# Patient Record
Sex: MALE | Race: WHITE | NOT HISPANIC OR LATINO | Employment: FULL TIME | URBAN - METROPOLITAN AREA
[De-identification: names, ages, dates, MRNs, and addresses within clinical notes are randomized per-mention and may not be internally consistent; named-entity substitution may affect disease eponyms.]

---

## 2017-09-18 RX ORDER — BUTALBITAL, ACETAMINOPHEN AND CAFFEINE 50; 325; 40 MG/1; MG/1; MG/1
TABLET ORAL
Qty: 30 TABLET | Refills: 3 | Status: SHIPPED | OUTPATIENT
Start: 2017-09-18 | End: 2018-01-18 | Stop reason: SDUPTHER

## 2017-10-26 ENCOUNTER — TELEPHONE (OUTPATIENT)
Dept: PRIMARY CARE CLINIC | Facility: CLINIC | Age: 37
End: 2017-10-26

## 2017-10-26 NOTE — TELEPHONE ENCOUNTER
----- Message from Sasha Dowd sent at 10/26/2017  2:52 PM CDT -----  Contact: Patient  Patient, Hadley Fulton,[phone] is calling regarding a same day appointment  (10/26) or appointment for tomorrow (10/27).  Patient has a rash on his upper leg on both sides, rash has really flared up.  Dr. Delgado has been treating patient for this, patient feels it needs attention asap.  1st available appoint 11/10/2017.   Please advise.  Thanks!

## 2017-10-26 NOTE — TELEPHONE ENCOUNTER
Pt denied appointment offered for tomorrow (10/27/17) says he is going somewhere else because the call room told him he couldn't be seen for 2 weeks. I told pt Dr Delgado's scheudle was booked so that's why they told him that BUT Dr Delgado would see him anyway. Pt denied

## 2018-01-18 RX ORDER — BUTALBITAL, ACETAMINOPHEN AND CAFFEINE 50; 325; 40 MG/1; MG/1; MG/1
TABLET ORAL
Qty: 30 TABLET | Refills: 0 | Status: SHIPPED | OUTPATIENT
Start: 2018-01-18 | End: 2018-03-14

## 2018-03-14 ENCOUNTER — OFFICE VISIT (OUTPATIENT)
Dept: PRIMARY CARE CLINIC | Facility: CLINIC | Age: 38
End: 2018-03-14
Payer: COMMERCIAL

## 2018-03-14 VITALS
DIASTOLIC BLOOD PRESSURE: 86 MMHG | OXYGEN SATURATION: 97 % | BODY MASS INDEX: 32.72 KG/M2 | HEART RATE: 69 BPM | SYSTOLIC BLOOD PRESSURE: 135 MMHG | RESPIRATION RATE: 18 BRPM | TEMPERATURE: 99 F | WEIGHT: 246.88 LBS | HEIGHT: 73 IN

## 2018-03-14 DIAGNOSIS — J01.90 ACUTE NON-RECURRENT SINUSITIS, UNSPECIFIED LOCATION: Primary | ICD-10-CM

## 2018-03-14 PROCEDURE — 99999 PR PBB SHADOW E&M-EST. PATIENT-LVL III: CPT | Mod: PBBFAC,,, | Performed by: INTERNAL MEDICINE

## 2018-03-14 PROCEDURE — 96372 THER/PROPH/DIAG INJ SC/IM: CPT | Mod: S$GLB,,, | Performed by: INTERNAL MEDICINE

## 2018-03-14 PROCEDURE — 99213 OFFICE O/P EST LOW 20 MIN: CPT | Mod: 25,S$GLB,, | Performed by: INTERNAL MEDICINE

## 2018-03-14 RX ORDER — BUPRENORPHINE AND NALOXONE 8; 2 MG/1; MG/1
FILM, SOLUBLE BUCCAL; SUBLINGUAL
COMMUNITY
End: 2023-06-26

## 2018-03-14 RX ORDER — ZOLPIDEM TARTRATE 10 MG/1
5 TABLET ORAL NIGHTLY PRN
COMMUNITY
End: 2023-06-26

## 2018-03-14 RX ORDER — CODEINE PHOSPHATE AND GUAIFENESIN 10; 100 MG/5ML; MG/5ML
5 SOLUTION ORAL 3 TIMES DAILY PRN
Qty: 150 ML | Refills: 0 | Status: SHIPPED | OUTPATIENT
Start: 2018-03-14 | End: 2018-03-24

## 2018-03-14 RX ORDER — AMOXICILLIN AND CLAVULANATE POTASSIUM 875; 125 MG/1; MG/1
1 TABLET, FILM COATED ORAL EVERY 12 HOURS
Qty: 20 TABLET | Refills: 0 | Status: SHIPPED | OUTPATIENT
Start: 2018-03-14 | End: 2018-03-24

## 2018-03-14 RX ORDER — BETAMETHASONE SODIUM PHOSPHATE AND BETAMETHASONE ACETATE 3; 3 MG/ML; MG/ML
12 INJECTION, SUSPENSION INTRA-ARTICULAR; INTRALESIONAL; INTRAMUSCULAR; SOFT TISSUE
Status: COMPLETED | OUTPATIENT
Start: 2018-03-14 | End: 2018-03-14

## 2018-03-14 RX ADMIN — BETAMETHASONE SODIUM PHOSPHATE AND BETAMETHASONE ACETATE 12 MG: 3; 3 INJECTION, SUSPENSION INTRA-ARTICULAR; INTRALESIONAL; INTRAMUSCULAR; SOFT TISSUE at 12:03

## 2018-03-14 NOTE — PROGRESS NOTES
Patient ID by name and . NKDA. Celestone 12 mg IM injection given in left ventrogluteal using aseptic technique. Aspirated with no blood noted. Patient tolerated well. Given per physicians order. No adverse reaction noted.

## 2018-03-14 NOTE — PROGRESS NOTES
Subjective:       Patient ID: Hadley Fulton is a 37 y.o. male.    Chief Complaint: Sinusitis    HPI  Pt c/o sinuses congestion coughing  Cough more at night x 3-4 days not better no h/o allergy asthma no smoke  Review of Systems    Objective:      Physical Exam   Constitutional: He is oriented to person, place, and time. He appears well-developed and well-nourished. No distress.   HENT:   Head: Normocephalic and atraumatic.   Right Ear: External ear normal.   Left Ear: External ear normal.   Nose: Nose normal.   Mouth/Throat: Oropharynx is clear and moist. No oropharyngeal exudate.   Eyes: Conjunctivae and EOM are normal. Pupils are equal, round, and reactive to light. Right eye exhibits no discharge. Left eye exhibits no discharge.   Neck: Normal range of motion. Neck supple. No thyromegaly present.   Cardiovascular: Normal rate, regular rhythm, normal heart sounds and intact distal pulses.  Exam reveals no gallop and no friction rub.    No murmur heard.  Pulmonary/Chest: Effort normal and breath sounds normal. No respiratory distress. He has no wheezes. He has no rales. He exhibits no tenderness.   Abdominal: Soft. Bowel sounds are normal. He exhibits no distension. There is no tenderness. There is no rebound and no guarding.   Musculoskeletal: Normal range of motion. He exhibits no edema, tenderness or deformity.   Lymphadenopathy:     He has no cervical adenopathy.   Neurological: He is alert and oriented to person, place, and time.   Skin: Skin is warm and dry. Capillary refill takes less than 2 seconds. No rash noted. No erythema.   Psychiatric: He has a normal mood and affect. Judgment and thought content normal.   Nursing note and vitals reviewed.      Assessment:       No diagnosis found.    Plan:       There are no diagnoses linked to this encounter.

## 2018-04-08 ENCOUNTER — OFFICE VISIT (OUTPATIENT)
Dept: URGENT CARE | Facility: CLINIC | Age: 38
End: 2018-04-08
Payer: COMMERCIAL

## 2018-04-08 VITALS
WEIGHT: 235 LBS | HEIGHT: 73 IN | OXYGEN SATURATION: 97 % | DIASTOLIC BLOOD PRESSURE: 78 MMHG | HEART RATE: 80 BPM | SYSTOLIC BLOOD PRESSURE: 129 MMHG | BODY MASS INDEX: 31.14 KG/M2 | TEMPERATURE: 98 F

## 2018-04-08 DIAGNOSIS — R21 RASH: ICD-10-CM

## 2018-04-08 DIAGNOSIS — H70.91 MASTOIDITIS OF RIGHT SIDE: Primary | ICD-10-CM

## 2018-04-08 PROCEDURE — 99203 OFFICE O/P NEW LOW 30 MIN: CPT | Mod: S$GLB,,, | Performed by: FAMILY MEDICINE

## 2018-04-08 RX ORDER — VALACYCLOVIR HYDROCHLORIDE 1 G/1
1000 TABLET, FILM COATED ORAL 3 TIMES DAILY
Qty: 21 TABLET | Refills: 0 | Status: SHIPPED | OUTPATIENT
Start: 2018-04-08 | End: 2018-04-09 | Stop reason: SDUPTHER

## 2018-04-08 RX ORDER — CLINDAMYCIN HYDROCHLORIDE 300 MG/1
300 CAPSULE ORAL EVERY 8 HOURS
Qty: 30 CAPSULE | Refills: 0 | Status: SHIPPED | OUTPATIENT
Start: 2018-04-08 | End: 2018-04-18

## 2018-04-08 NOTE — PROGRESS NOTES
"Subjective:       Patient ID: Hadley Fulton is a 37 y.o. male.    Vitals:  height is 6' 1" (1.854 m) and weight is 106.6 kg (235 lb). His temperature is 97.8 °F (36.6 °C). His blood pressure is 129/78 and his pulse is 80. His oxygen saturation is 97%.     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    Per patient 4-5 day ago right ear pain started. Then 2-3 days ago painful rash started on his scalp and has spread upwards. He reports the rash is painful.  He has is always wrestling and denies any head trauma or injury recently. No sick contacts. He has never had anything like this before. He denies fevers, chills, fatigue, malaise or night sweats.         Rash   This is a new problem. The current episode started in the past 7 days. The affected locations include the scalp. The rash is characterized by pain, swelling and redness. Past treatments include antibiotics. The treatment provided mild relief.     Review of Systems   Constitution: Negative.   HENT: Positive for ear pain.    Eyes: Negative.    Cardiovascular: Negative.    Respiratory: Negative.    Endocrine: Negative.    Skin: Positive for rash and suspicious lesions.   Musculoskeletal: Positive for neck pain.   Gastrointestinal: Negative.    Genitourinary: Negative.        Objective:      Physical Exam   Constitutional: He is oriented to person, place, and time. He appears well-developed and well-nourished. No distress.   HENT:   Head: Normocephalic and atraumatic.   Right Ear: Hearing, tympanic membrane, external ear and ear canal normal.   Left Ear: Hearing, tympanic membrane, external ear and ear canal normal.   Nose: Mucosal edema present. Right sinus exhibits no maxillary sinus tenderness and no frontal sinus " tenderness. Left sinus exhibits no maxillary sinus tenderness and no frontal sinus tenderness.   Mouth/Throat: Uvula is midline and oropharynx is clear and moist. No oropharyngeal exudate, posterior oropharyngeal edema, posterior oropharyngeal erythema or tonsillar abscesses.   TTP right mastoid    Eyes: Conjunctivae and EOM are normal. Pupils are equal, round, and reactive to light.   Neck: Normal range of motion. Neck supple.   Cardiovascular: Normal rate, regular rhythm, normal heart sounds and intact distal pulses.  Exam reveals no gallop and no friction rub.    No murmur heard.  Pulmonary/Chest: Effort normal and breath sounds normal. No respiratory distress. He has no wheezes. He has no rales. He exhibits no tenderness.   Abdominal: Soft. Bowel sounds are normal. He exhibits no distension and no mass. There is no tenderness. There is no rebound and no guarding.   Musculoskeletal: Normal range of motion.        Cervical back: Normal.   Neurological: He is alert and oriented to person, place, and time. No cranial nerve deficit.   Skin: Skin is warm and dry. He is not diaphoretic.   Ascending erythematous papules scalp midline    Vitals reviewed.      Assessment:       1. Mastoiditis of right side    2. Rash        Plan:         Mastoiditis of right side  -discussed the need to close follow up and ER prompts reviewed, will schedule CT scan and ENT follow up with any worsening in the next 24 hours  -handout given about mastoiditis   -     CT Maxillofacial Without Contrast; Future; Expected date: 04/08/2018  -     Ambulatory consult to ENT    Rash  -concern for shingles, will start valtrex    Other orders  -     valACYclovir (VALTREX) 1000 MG tablet; Take 1 tablet (1,000 mg total) by mouth 3 (three) times daily.  Dispense: 21 tablet; Refill: 0  -     clindamycin (CLEOCIN) 300 MG capsule; Take 1 capsule (300 mg total) by mouth every 8 (eight) hours.  Dispense: 30 capsule; Refill: 0

## 2018-04-08 NOTE — PATIENT INSTRUCTIONS
When Your Child Has Mastoiditis     Infection of the mastoid can cause the skin above it to swell. This swelling can cause the ear to turn forward.   Your child has mastoiditis. This is an infection of the mastoid, the hard, bony area located right behind the ear. It's most often the result of an infection that started in the middle ear and spread to the bone.  What are the risk factors for mastoiditis?  Mastoiditis is more common in children than adults. Having any of the following may make getting it more likely:  · An ear infection  · Eustachian tube problems  · A problem with the immune system  What are the signs and symptoms of mastoiditis?  · Fever  · Ear pain  · Swelling over the mastoid bone causing the ear to turn forward  · Redness, tenderness, or swelling behind the ear  · Drainage from the ear canal or dizziness. This is uncommon.  · Weakened facial muscles. This is also uncommon.  How is mastoiditis diagnosed?  Your childs healthcare provider will ask about your childs medical history. He or she will also do a physical exam. This helps find the best treatment. An imaging test, such as CT scan, may be done to help the healthcare provider make a diagnosis and view the mastoid area.  How is mastoiditis treated?  If mastoiditis is suspected, your child may be admitted into the hospital for evaluation and treatment. The hospital stay can last for 5 to 7 days or more. In the hospital, your child will be given intravenous (IV) antibiotics for the infection. Your child will see an otolaryngologist. This is a doctor who specializes in treating problems of the ears, nose, and throat (ENT). The ENT doctor may need to make a tiny incision in the eardrum to allow trapped fluid to drain out. This is called a myringotomy. It relieves pressure and the fluid can be tested. The test results help the ENT doctor determine which antibiotic to give your child. If these treatments dont work, your child may need surgery  to remove parts of the infected mastoid. This is called a mastoidectomy.  Long-term concerns  Once treated, the mastoid often causes no long-term problems. But if left untreated, mastoiditis can lead to a serious infection in and around the brain. To protect your childs health, follow up with his or her regular healthcare provider.  Date Last Reviewed: 11/1/2016  © 5292-9971 [employer]. [address] [place] [address], [place] [address]. All rights reserved. This information is not intended as a substitute for professional medical care. Always follow your healthcare professional's instructions.

## 2018-04-09 ENCOUNTER — OFFICE VISIT (OUTPATIENT)
Dept: OTOLARYNGOLOGY | Facility: CLINIC | Age: 38
End: 2018-04-09
Payer: COMMERCIAL

## 2018-04-09 ENCOUNTER — TELEPHONE (OUTPATIENT)
Dept: INTERNAL MEDICINE | Facility: CLINIC | Age: 38
End: 2018-04-09

## 2018-04-09 VITALS
HEIGHT: 73 IN | SYSTOLIC BLOOD PRESSURE: 112 MMHG | DIASTOLIC BLOOD PRESSURE: 77 MMHG | TEMPERATURE: 99 F | HEART RATE: 77 BPM

## 2018-04-09 DIAGNOSIS — B02.29 HERPES ZOSTER INVOLVING CERVICAL DERMATOME: Primary | ICD-10-CM

## 2018-04-09 DIAGNOSIS — H70.91 MASTOIDITIS OF RIGHT SIDE: Primary | ICD-10-CM

## 2018-04-09 PROCEDURE — 99204 OFFICE O/P NEW MOD 45 MIN: CPT | Mod: S$GLB,,, | Performed by: OTOLARYNGOLOGY

## 2018-04-09 RX ORDER — VALACYCLOVIR HYDROCHLORIDE 1 G/1
1000 TABLET, FILM COATED ORAL 3 TIMES DAILY
Qty: 21 TABLET | Refills: 1 | Status: SHIPPED | OUTPATIENT
Start: 2018-04-09 | End: 2018-07-30

## 2018-04-09 RX ORDER — PREDNISONE 10 MG/1
TABLET ORAL
Qty: 27 TABLET | Refills: 0 | Status: SHIPPED | OUTPATIENT
Start: 2018-04-09 | End: 2018-07-30

## 2018-04-09 RX ORDER — ACYCLOVIR 50 MG/G
CREAM TOPICAL
Qty: 5 G | Refills: 2 | Status: SHIPPED | OUTPATIENT
Start: 2018-04-09 | End: 2018-04-12 | Stop reason: SDUPTHER

## 2018-04-09 NOTE — TELEPHONE ENCOUNTER
----- Message from Pat Moulton sent at 4/9/2018  8:08 AM CDT -----  Contact: pt   x 1st Request  _ 2nd Request  _ 3rd Request    Who:pt     Why: pt is requesting to speak with Dr. Larry. Pt states rash has not improve and seems to be getting worse from his recent visit to . Please call and advise.      What Number to Call Back:[phone]     When to Expect a call back: (Before the end of the day)  -- if call after 3:00 call back will be tomorrow.

## 2018-04-09 NOTE — LETTER
April 12, 2018      Blanca Larry MD  [address]  [facility] [address]           [facility]  [address]  [facility] [address]  Phone: [phone]  Fax: [phone]          Patient: Hadley Fulton   MR Number: [id number]   YOB: 1980   Date of Visit: 4/9/2018       Dear Dr. Blanca Larry:    Thank you for referring Hadley Fulton to me for evaluation. Attached you will find relevant portions of my assessment and plan of care.    If you have questions, please do not hesitate to call me. I look forward to following Hadley Fulton along with you.    Sincerely,        Enclosure  CC:  No Recipients    If you would like to receive this communication electronically, please contact [email or web address] or [phone] to request more information on [employer] Link access.    For providers and/or their staff who would like to refer a patient to [facility], please contact us through our one-stop-shop provider referral line, [facility], at [phone].    If you feel you have received this communication in error or would no longer like to receive these types of communications, please e-mail [email or web address]

## 2018-04-09 NOTE — TELEPHONE ENCOUNTER
Discussed symptoms rash is the same, the right ear mass has worsened   Please schedule for urgent ENT appointment

## 2018-04-09 NOTE — TELEPHONE ENCOUNTER
Per Dr. Larry, awaiting to hear back from the [facility] ENT department regarding a same day appointment.  1:05 PM  Patient has been scheduled as recommended.

## 2018-04-10 ENCOUNTER — PATIENT MESSAGE (OUTPATIENT)
Dept: OTOLARYNGOLOGY | Facility: CLINIC | Age: 38
End: 2018-04-10

## 2018-04-10 ENCOUNTER — TELEPHONE (OUTPATIENT)
Dept: OTOLARYNGOLOGY | Facility: CLINIC | Age: 38
End: 2018-04-10

## 2018-04-11 NOTE — TELEPHONE ENCOUNTER
Called pt and called pharmacist and ordered five 5 gm tubes of Zovirax cream since cream only available in this small size.

## 2018-04-12 RX ORDER — ACYCLOVIR 50 MG/G
CREAM TOPICAL
Qty: 25 G | Refills: 0 | Status: SHIPPED | OUTPATIENT
Start: 2018-04-12 | End: 2018-04-19

## 2018-04-19 ENCOUNTER — OFFICE VISIT (OUTPATIENT)
Dept: OTOLARYNGOLOGY | Facility: CLINIC | Age: 38
End: 2018-04-19
Payer: COMMERCIAL

## 2018-04-19 VITALS
DIASTOLIC BLOOD PRESSURE: 84 MMHG | SYSTOLIC BLOOD PRESSURE: 128 MMHG | HEART RATE: 78 BPM | TEMPERATURE: 97 F | HEIGHT: 73 IN

## 2018-04-19 DIAGNOSIS — B02.29 HERPES ZOSTER INVOLVING CERVICAL DERMATOME: Primary | ICD-10-CM

## 2018-04-19 PROCEDURE — 99213 OFFICE O/P EST LOW 20 MIN: CPT | Mod: S$GLB,,, | Performed by: OTOLARYNGOLOGY

## 2018-04-19 RX ORDER — ACYCLOVIR 50 MG/G
OINTMENT TOPICAL
Qty: 30 G | Refills: 1 | Status: SHIPPED | OUTPATIENT
Start: 2018-04-19 | End: 2018-07-30

## 2018-04-19 NOTE — PATIENT INSTRUCTIONS
Finish remaining valacyclovir pills.  Use zovirax ointment to lesions as per Rx.  Take Benadryl every evening as needed.  Follow up in 2 weeks.

## 2018-04-19 NOTE — LETTER
April 20, 2018      Blanca Larry MD  [address]  [facility] [address]           [facility]  [address]  [facility] [address]  Phone: [phone]  Fax: [phone]          Patient: Hadley Fulton   MR Number: [id number]   YOB: 1980   Date of Visit: 4/19/2018       Dear Dr. Blanca Larry:    Thank you for referring Hadley Fulton to me for evaluation. Attached you will find relevant portions of my assessment and plan of care.    If you have questions, please do not hesitate to call me. I look forward to following Hadley Fulton along with you.    Sincerely,    Norma Whitehead MD    Enclosure  CC:  No Recipients    If you would like to receive this communication electronically, please contact [email or web address] or [phone] to request more information on [employer] Link access.    For providers and/or their staff who would like to refer a patient to [facility], please contact us through our one-stop-shop provider referral line, [facility], at [phone].    If you feel you have received this communication in error or would no longer like to receive these types of communications, please e-mail [email or web address]

## 2018-04-20 NOTE — PROGRESS NOTES
Subjective:       Patient ID: Hadley Fulton is a 37 y.o. male.    Chief Complaint: Other (Right ear pain, rash, swelling)    He is a new patient fit in today for evaluation of symptoms for the past 5-6 days.  He began with right otalgia followed by a painful rash behind the ear and in the scalp area.  He went to urgent care yesterday for this and received antibiotics as well as Valtrex.  He denies associated change in hearing or tinnitus.  There is no dizziness or headache.  He denies any eye complaints.  There is no history of diabetes or immune suppression.  He had an upper respiratory infection about 2-3 weeks prior associated with nasal congestion rhinorrhea and cough.  He took Amoxil for this and symptoms cleared.  There is no other rash or skin complaints.  He has also had a tender nodule behind the right ear.  Prior otologic history includes few bouts of external otitis associated with wearing earplugs.  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  He also recalls a few events of explosives near the right ear during this time.  There has been no recent change in his baseline hearing or tinnitus however.          Review of Systems   Ears: Positive for ear pain.  Negative for ear pressure, ear discharge, ear infections, dizziness, head trauma, taken gentramycin/streptomycin and family history of hearing loss.    Nose:  Negative for nosebleeds, nasal obstruction, nasal or sinus surgery, loss of smell, postnasal drip and snoring.    Mouth/Throat: Positive for neck lumps. Negative for pain swallowing, impaired swallowing, hoarse voice, throat mass, neck mass and oral ulcers.   Constitutional: Negative for recent unexplained weight loss, fever and chills.    Eyes:  Negative for history of glaucoma and visual change.   Cardiovascular:  Negative for chest pain, palpitations  and history of high blood pressure.   Respiratory:  Negative for asthma, emphysema, history of tuberculosis, recent cough and shortness of breath.    Gastrointestinal:  Negative for history of stomach ulcers or pain, acid reflux, indigestion, blood in stool and change in stool.   Other:  Positive for arthritis and rash. Negative for kidney problem, bladder problem, prostate disease, weakness, disturbances in coordination, slurred, confusion, swollen glands, anemia and persistent infections.           Objective:        Vitals:    04/09/18 1622   BP: 112/77   Pulse: 77   Temp: 99.1 °F (37.3 °C)       Physical Exam   Constitutional: He is oriented to person, place, and time. He appears well-developed and well-nourished. No distress.   HENT:   Head: Normocephalic and atraumatic.   Right Ear: Tympanic membrane, external ear and ear canal normal.   Left Ear: Tympanic membrane, external ear and ear canal normal.   Nose: No mucosal edema, rhinorrhea or nasal deformity. No epistaxis.   Mouth/Throat: Uvula is midline, oropharynx is clear and moist and mucous membranes are normal. No oral lesions. No trismus in the jaw. No uvula swelling. No oropharyngeal exudate, posterior oropharyngeal edema or posterior oropharyngeal erythema.   Eyes: Conjunctivae and EOM are normal. Pupils are equal, round, and reactive to light. Right eye exhibits no discharge. Left eye exhibits no discharge. No scleral icterus.   Neck: Normal range of motion and phonation normal. Neck supple. No tracheal deviation present. No thyromegaly present.   Pulmonary/Chest: Effort normal. No stridor. No respiratory distress.   Musculoskeletal: Normal range of motion. He exhibits no deformity.   Lymphadenopathy:        Head (right side): Posterior auricular adenopathy present.   There is a tender right postauricular lymph node of approximately 1.5 cm.   Neurological: He is alert and oriented to person, place, and time. He displays no weakness. No cranial nerve  deficit. Coordination normal.   Skin: Skin is warm and dry. Rash noted. He is not diaphoretic.   There is a vesicular rash noted at the right temporal parietal and occipital scalp area as well as few right preauricular lesions.   Psychiatric: He has a normal mood and affect. His behavior is normal. His speech is not slurred.       Tests / Results:        Assessment:       1. Herpes zoster involving cervical dermatome        Plan:        Reviewed all above with patient and considerations and recommendations and answered questions.  Additional Valtrex ordered as well as topical acyclovir and reviewed precautions and exposures.  Discussed course of steroids and pros and cons and risks including mood change, GI upset, increased blood sugar, immune suppression, bone and joint problems and answered questions.  He understands and would like this prescribed.  Prednisone taper ordered and reviewed instructions as per prescription.  Follow-up in 10 days unless change or problems prior.

## 2018-04-20 NOTE — PROGRESS NOTES
Subjective:       Patient ID: Hadley Fulton is a 37 y.o. male.    Chief Complaint: Follow-up (Pain, rash better)    He is here for follow-up.  He states he took the steroids without difficulty and finished the course yesterday.  He remains on the valacyclovir without difficulty.  He reports he is doing better and states there have been no new lesions since soon after his last visit with me.  He believes the lesions that are present have been healing gradually.  He states the pain has decreased significantly especially over the past 48 hours and feels about 90% better.  There is only occasional pain which he describes as a stabbing pain and there is still some sensitivity to touch.  However the main symptom at this point is a fair amount of itching associated with the remaining lesions.  He has been unable to obtain the topical acyclovir unfortunately despite several phone calls and exchanges with his pharmacy.  This is apparently an insurance coverage issue and will attempt to order the ointment instead.  Otherwise he states he is progressing well with no new complaints.          Review of Systems   Ears: Positive for ear pain.  Negative for ear pressure, ear discharge, ear infections, dizziness, head trauma, taken gentramycin/streptomycin and family history of hearing loss.    Nose:  Negative for nosebleeds, nasal obstruction, nasal or sinus surgery, loss of smell, postnasal drip and snoring.    Mouth/Throat: Positive for neck lumps. Negative for pain swallowing, impaired swallowing, hoarse voice, throat mass, neck mass and oral ulcers.   Constitutional: Negative for recent unexplained weight loss, fever and chills.    Eyes:  Negative for history of glaucoma and visual change.   Cardiovascular:  Negative for chest pain, palpitations and history of high blood pressure.   Respiratory:  Negative for asthma, emphysema, history of tuberculosis, recent cough and shortness of breath.    Gastrointestinal:  Negative for  history of stomach ulcers or pain, acid reflux, indigestion, blood in stool and change in stool.   Other:  Positive for arthritis and rash. Negative for kidney problem, bladder problem, prostate disease, weakness, disturbances in coordination, slurred, confusion, swollen glands, anemia and persistent infections.           Objective:        Vitals:    04/19/18 1531   BP: 128/84   Pulse: 78   Temp: 97.3 °F (36.3 °C)       Physical Exam   Constitutional: He is oriented to person, place, and time. He appears well-developed and well-nourished. No distress.   HENT:   Head: Normocephalic and atraumatic.   Right Ear: Tympanic membrane, external ear and ear canal normal.   Left Ear: Tympanic membrane, external ear and ear canal normal.   Nose: No mucosal edema, rhinorrhea or nasal deformity. No epistaxis.   Mouth/Throat: Uvula is midline, oropharynx is clear and moist and mucous membranes are normal. No oral lesions. No trismus in the jaw. No uvula swelling. No oropharyngeal exudate, posterior oropharyngeal edema or posterior oropharyngeal erythema.   Eyes: Conjunctivae and EOM are normal. Pupils are equal, round, and reactive to light. Right eye exhibits no discharge. Left eye exhibits no discharge. No scleral icterus.   Neck: Normal range of motion and phonation normal. Neck supple. No tracheal deviation present. No thyromegaly present.   Pulmonary/Chest: Effort normal. No stridor. No respiratory distress.   Musculoskeletal: Normal range of motion. He exhibits no deformity.   Lymphadenopathy:        Head (right side): Posterior auricular adenopathy present.   The right postauricular lymph node has decreased significantly with only mild residual fullness and minimal if any tenderness today.   Neurological: He is alert and oriented to person, place, and time. He displays no weakness. No cranial nerve deficit. Coordination normal.   Skin: Skin is warm and dry. Rash noted. He is not diaphoretic.   The vesicular rash at the  right temporal parietal area is improved though not resolved and there remains some lesions in the occipital area.   Psychiatric: He has a normal mood and affect. His behavior is normal. His speech is not slurred.       Tests / Results:        Assessment:       1. Herpes zoster involving cervical dermatome        Plan:        Reviewed all above and considerations and recommendations and answered questions.  Will complete the course of additional valacyclovir.  Acyclovir ointment ordered and reviewed use with patient.  Trial of Benadryl q HS as well as daytime as tolerated reviewed.  Follow-up in 2 weeks unless problems prior.

## 2018-07-30 ENCOUNTER — OFFICE VISIT (OUTPATIENT)
Dept: PRIMARY CARE CLINIC | Facility: CLINIC | Age: 38
End: 2018-07-30
Payer: COMMERCIAL

## 2018-07-30 VITALS
RESPIRATION RATE: 18 BRPM | TEMPERATURE: 98 F | HEART RATE: 58 BPM | WEIGHT: 232.19 LBS | OXYGEN SATURATION: 96 % | DIASTOLIC BLOOD PRESSURE: 76 MMHG | SYSTOLIC BLOOD PRESSURE: 123 MMHG | BODY MASS INDEX: 30.77 KG/M2 | HEIGHT: 73 IN

## 2018-07-30 DIAGNOSIS — G89.29 CHRONIC MIDLINE THORACIC BACK PAIN: Primary | ICD-10-CM

## 2018-07-30 DIAGNOSIS — M54.6 CHRONIC MIDLINE THORACIC BACK PAIN: Primary | ICD-10-CM

## 2018-07-30 PROCEDURE — 99999 PR PBB SHADOW E&M-EST. PATIENT-LVL III: CPT | Mod: PBBFAC,,, | Performed by: FAMILY MEDICINE

## 2018-07-30 PROCEDURE — 99213 OFFICE O/P EST LOW 20 MIN: CPT | Mod: S$GLB,,, | Performed by: FAMILY MEDICINE

## 2018-07-30 RX ORDER — CYCLOBENZAPRINE HCL 10 MG
10 TABLET ORAL 3 TIMES DAILY PRN
Qty: 30 TABLET | Refills: 0 | Status: SHIPPED | OUTPATIENT
Start: 2018-07-30 | End: 2019-02-26

## 2018-07-30 RX ORDER — ETODOLAC 400 MG/1
400 TABLET, FILM COATED ORAL 2 TIMES DAILY
Qty: 28 TABLET | Refills: 0 | Status: SHIPPED | OUTPATIENT
Start: 2018-07-30 | End: 2018-08-13

## 2018-07-30 NOTE — PROGRESS NOTES
"Subjective:       Patient ID: Hadley Fulton is a 37 y.o. male.    Chief Complaint: Back Pain (x 5 days)    Back Pain   This is a recurrent problem. The current episode started in the past 7 days. The problem occurs constantly. The problem is unchanged. The pain is present in the thoracic spine. The quality of the pain is described as burning and stabbing. The pain does not radiate. The pain is severe. The symptoms are aggravated by bending, twisting and position. Pertinent negatives include no chest pain, fever, numbness, paresthesias, tingling or weakness. He has tried NSAIDs for the symptoms. The treatment provided mild relief.     Review of Systems   Constitutional: Negative for fever.   Respiratory: Negative for shortness of breath.    Cardiovascular: Negative for chest pain.   Musculoskeletal: Positive for back pain.   Neurological: Negative for tingling, weakness, numbness and paresthesias.       Objective:      Vitals:    07/30/18 1325   BP: 123/76   BP Location: Left arm   Patient Position: Sitting   BP Method: Large (Automatic)   Pulse: (!) 58   Resp: 18   Temp: 98.2 °F (36.8 °C)   TempSrc: Oral   SpO2: 96%   Weight: 105.3 kg (232 lb 3.2 oz)   Height: 6' 1" (1.854 m)     Physical Exam   Constitutional: He is oriented to person, place, and time. He appears well-developed and well-nourished.   HENT:   Head: Normocephalic and atraumatic.   Cardiovascular: Normal rate, regular rhythm and normal heart sounds.    Pulmonary/Chest: Effort normal and breath sounds normal.   Musculoskeletal: He exhibits no edema.        Thoracic back: He exhibits no tenderness, no bony tenderness and no deformity.   Neurological: He is alert and oriented to person, place, and time. He has normal strength and normal reflexes.   Skin: Skin is warm and dry.   Nursing note and vitals reviewed.      Assessment:       1. Chronic midline thoracic back pain        Plan:       Chronic midline thoracic back pain  -     etodolac " (LODINE) 400 MG tablet; Take 1 tablet (400 mg total) by mouth 2 (two) times daily. for 14 days  Dispense: 28 tablet; Refill: 0  -     cyclobenzaprine (FLEXERIL) 10 MG tablet; Take 1 tablet (10 mg total) by mouth 3 (three) times daily as needed for Muscle spasms.  Dispense: 30 tablet; Refill: 0      Medication List with Changes/Refills   New Medications    CYCLOBENZAPRINE (FLEXERIL) 10 MG TABLET    Take 1 tablet (10 mg total) by mouth 3 (three) times daily as needed for Muscle spasms.    ETODOLAC (LODINE) 400 MG TABLET    Take 1 tablet (400 mg total) by mouth 2 (two) times daily. for 14 days   Current Medications    BUPRENORPHINE-NALOXONE (SUBOXONE) 8-2 MG FILM    Place under the tongue.    ZOLPIDEM (AMBIEN) 10 MG TAB    Take 5 mg by mouth nightly as needed.   Discontinued Medications    ACYCLOVIR 5% (ZOVIRAX) 5 % OINTMENT    Apply topically 6 (six) times daily.    PREDNISONE (DELTASONE) 10 MG TABLET    Take 4 pills daily day # 1 - 3.  Then take 3 pills daily day # 4 - 6.  Then take 2 pills daily day # 7 - 9.    Take with food.  Start in am.    VALACYCLOVIR (VALTREX) 1000 MG TABLET    Take 1 tablet (1,000 mg total) by mouth 3 (three) times daily.

## 2019-02-26 ENCOUNTER — OFFICE VISIT (OUTPATIENT)
Dept: PRIMARY CARE CLINIC | Facility: CLINIC | Age: 39
End: 2019-02-26
Payer: COMMERCIAL

## 2019-02-26 VITALS
BODY MASS INDEX: 30.95 KG/M2 | DIASTOLIC BLOOD PRESSURE: 84 MMHG | TEMPERATURE: 98 F | SYSTOLIC BLOOD PRESSURE: 122 MMHG | HEIGHT: 73 IN | OXYGEN SATURATION: 99 % | RESPIRATION RATE: 18 BRPM | WEIGHT: 233.5 LBS | HEART RATE: 67 BPM

## 2019-02-26 DIAGNOSIS — Z13.6 ENCOUNTER FOR SCREENING FOR CARDIOVASCULAR DISORDERS: ICD-10-CM

## 2019-02-26 DIAGNOSIS — R06.00 DYSPNEA, UNSPECIFIED TYPE: ICD-10-CM

## 2019-02-26 DIAGNOSIS — R53.83 FATIGUE, UNSPECIFIED TYPE: ICD-10-CM

## 2019-02-26 DIAGNOSIS — R60.0 BILATERAL LEG EDEMA: Primary | ICD-10-CM

## 2019-02-26 PROCEDURE — 99214 PR OFFICE/OUTPT VISIT, EST, LEVL IV, 30-39 MIN: ICD-10-PCS | Mod: S$GLB,,, | Performed by: FAMILY MEDICINE

## 2019-02-26 PROCEDURE — 93000 EKG 12-LEAD: ICD-10-PCS | Mod: S$GLB,,, | Performed by: INTERNAL MEDICINE

## 2019-02-26 PROCEDURE — 99214 OFFICE O/P EST MOD 30 MIN: CPT | Mod: S$GLB,,, | Performed by: FAMILY MEDICINE

## 2019-02-26 PROCEDURE — 99999 PR PBB SHADOW E&M-EST. PATIENT-LVL III: ICD-10-PCS | Mod: PBBFAC,,, | Performed by: FAMILY MEDICINE

## 2019-02-26 PROCEDURE — 99999 PR PBB SHADOW E&M-EST. PATIENT-LVL III: CPT | Mod: PBBFAC,,, | Performed by: FAMILY MEDICINE

## 2019-02-26 PROCEDURE — 93000 ELECTROCARDIOGRAM COMPLETE: CPT | Mod: S$GLB,,, | Performed by: INTERNAL MEDICINE

## 2019-02-26 RX ORDER — CLINDAMYCIN PHOSPHATE 11.9 MG/ML
SOLUTION TOPICAL
Refills: 1 | COMMUNITY
Start: 2019-02-14 | End: 2019-03-12

## 2019-02-26 RX ORDER — CLOBETASOL PROPIONATE 0.5 MG/G
CREAM TOPICAL
Refills: 0 | COMMUNITY
Start: 2019-02-14 | End: 2023-06-26

## 2019-02-26 RX ORDER — CLINDAMYCIN PHOSPHATE 10 MG/G
GEL TOPICAL
Refills: 1 | COMMUNITY
Start: 2019-02-18 | End: 2023-06-26

## 2019-02-26 NOTE — PROGRESS NOTES
"Subjective:       Patient ID: Hadley Fulton is a 38 y.o. male.    Chief Complaint: Sinusitis (x 4 days); Edema (legs); and Shortness of Breath (more often)    Also complains of bilateral leg swelling, mainly at the end of the day, for the past several years. No recent change in diet. Also complains of "random shortness of breath" off and on for the past 3-4 weeks, low energy. No chest pain or palpitations.      Sinusitis   This is a new problem. The current episode started in the past 7 days. The problem has been gradually worsening since onset. There has been no fever. Associated symptoms include congestion, shortness of breath and a sore throat. Past treatments include antibiotics. The treatment provided significant relief.     Review of Systems   Constitutional: Positive for fatigue. Negative for fever and unexpected weight change.   HENT: Positive for congestion and sore throat.    Eyes: Negative for visual disturbance.   Respiratory: Positive for shortness of breath.    Cardiovascular: Positive for leg swelling. Negative for chest pain and palpitations.   Gastrointestinal: Negative for nausea and vomiting.   Genitourinary: Negative for dysuria.   Musculoskeletal: Positive for arthralgias.   Skin: Positive for rash. Negative for wound.   Allergic/Immunologic: Negative for immunocompromised state.   Neurological: Negative for seizures.   Hematological: Does not bruise/bleed easily.   Psychiatric/Behavioral: Negative for agitation and confusion.       Objective:      Vitals:    02/26/19 1432   BP: 122/84   BP Location: Right arm   Patient Position: Sitting   BP Method: Large (Automatic)   Pulse: 67   Resp: 18   Temp: 98.4 °F (36.9 °C)   TempSrc: Oral   SpO2: 99%   Weight: 105.9 kg (233 lb 8 oz)   Height: 6' 1" (1.854 m)     Physical Exam   Constitutional: He is oriented to person, place, and time. He appears well-developed and well-nourished.   HENT:   Head: Normocephalic and atraumatic.   Mouth/Throat: " Oropharynx is clear and moist.   Eyes: EOM are normal. Pupils are equal, round, and reactive to light.   Neck: Neck supple. No JVD present. Carotid bruit is not present.   Cardiovascular: Normal rate, regular rhythm and normal heart sounds.   Pulses:       Radial pulses are 2+ on the right side, and 2+ on the left side.   Pulmonary/Chest: Effort normal and breath sounds normal.   Abdominal: Soft. Bowel sounds are normal. There is no tenderness.   Musculoskeletal: He exhibits edema (1+ to mid-calf bilaterally).   Neurological: He is alert and oriented to person, place, and time.   Skin: Skin is warm and dry.   Psychiatric: He has a normal mood and affect. His behavior is normal.   Nursing note and vitals reviewed.      Assessment:       1. Bilateral leg edema    2. Dyspnea, unspecified type    3. Encounter for screening for cardiovascular disorders    4. Fatigue, unspecified type        Plan:       Bilateral leg edema  -     Comprehensive metabolic panel; Future; Expected date: 02/26/2019  -     TSH; Future; Expected date: 02/26/2019  -     Echocardiogram stress test (Cupid Only); Future; Expected date: 03/05/2019  Unclear etiology, needs additional workup.  Dyspnea, unspecified type  -     CBC auto differential; Future; Expected date: 02/26/2019  -     EKG 12-lead  -     X-Ray Chest PA And Lateral; Future; Expected date: 02/26/2019  -     Echocardiogram stress test (Cupid Only); Future; Expected date: 03/05/2019  EKG normal, no acute changes  Encounter for screening for cardiovascular disorders  -     Lipid panel; Future; Expected date: 02/26/2019    Fatigue, unspecified type  -     CBC auto differential; Future; Expected date: 02/26/2019  -     Comprehensive metabolic panel; Future; Expected date: 02/26/2019  -     TSH; Future; Expected date: 02/26/2019  -     TESTOSTERONE PANEL; Future; Expected date: 02/26/2019  -     Echocardiogram stress test (Cupid Only); Future; Expected date: 03/05/2019      Medication List  with Changes/Refills   Current Medications    BUPRENORPHINE-NALOXONE (SUBOXONE) 8-2 MG FILM    Place under the tongue.    CLINDAMYCIN (CLEOCIN T) 1 % EXTERNAL SOLUTION    APPLY TO AFFECTED AREAS OF SKIN DAILY AS NEEDED    CLINDAMYCIN PHOSPHATE 1% (CLINDAGEL) 1 % GEL    APPLY TO AFFECTED AREAS DAILY AS NEEDED    CLOBETASOL (TEMOVATE) 0.05 % CREAM    APPLY TO AFFECTED AREAS OF SKIN DAILY AS NEEDED FOR ITCHING    ZOLPIDEM (AMBIEN) 10 MG TAB    Take 5 mg by mouth nightly as needed.   Discontinued Medications    CYCLOBENZAPRINE (FLEXERIL) 10 MG TABLET    Take 1 tablet (10 mg total) by mouth 3 (three) times daily as needed for Muscle spasms.

## 2019-03-12 ENCOUNTER — TELEPHONE (OUTPATIENT)
Dept: PRIMARY CARE CLINIC | Facility: CLINIC | Age: 39
End: 2019-03-12

## 2019-03-12 ENCOUNTER — OFFICE VISIT (OUTPATIENT)
Dept: PRIMARY CARE CLINIC | Facility: CLINIC | Age: 39
End: 2019-03-12
Payer: COMMERCIAL

## 2019-03-12 VITALS
HEART RATE: 85 BPM | OXYGEN SATURATION: 96 % | SYSTOLIC BLOOD PRESSURE: 129 MMHG | WEIGHT: 235 LBS | DIASTOLIC BLOOD PRESSURE: 84 MMHG | BODY MASS INDEX: 31.14 KG/M2 | RESPIRATION RATE: 16 BRPM | TEMPERATURE: 98 F | HEIGHT: 73 IN

## 2019-03-12 DIAGNOSIS — R42 DIZZINESS: Primary | ICD-10-CM

## 2019-03-12 DIAGNOSIS — G89.4 CHRONIC PAIN SYNDROME: ICD-10-CM

## 2019-03-12 DIAGNOSIS — F51.01 PRIMARY INSOMNIA: ICD-10-CM

## 2019-03-12 PROCEDURE — 99999 PR PBB SHADOW E&M-EST. PATIENT-LVL IV: ICD-10-PCS | Mod: PBBFAC,,, | Performed by: FAMILY MEDICINE

## 2019-03-12 PROCEDURE — 99214 OFFICE O/P EST MOD 30 MIN: CPT | Mod: S$GLB,,, | Performed by: FAMILY MEDICINE

## 2019-03-12 PROCEDURE — 99999 PR PBB SHADOW E&M-EST. PATIENT-LVL IV: CPT | Mod: PBBFAC,,, | Performed by: FAMILY MEDICINE

## 2019-03-12 PROCEDURE — 99214 PR OFFICE/OUTPT VISIT, EST, LEVL IV, 30-39 MIN: ICD-10-PCS | Mod: S$GLB,,, | Performed by: FAMILY MEDICINE

## 2019-03-12 RX ORDER — AMITRIPTYLINE HYDROCHLORIDE 10 MG/1
10-20 TABLET, FILM COATED ORAL NIGHTLY PRN
Qty: 30 TABLET | Refills: 0 | Status: SHIPPED | OUTPATIENT
Start: 2019-03-12 | End: 2019-03-12 | Stop reason: SDUPTHER

## 2019-03-12 RX ORDER — AMITRIPTYLINE HYDROCHLORIDE 10 MG/1
10-20 TABLET, FILM COATED ORAL NIGHTLY PRN
Qty: 30 TABLET | Refills: 0 | Status: SHIPPED | OUTPATIENT
Start: 2019-03-12 | End: 2023-06-26

## 2019-03-12 NOTE — TELEPHONE ENCOUNTER
Per Dr. Delgado cancel amitriptyline rx at [facility], patient wanted it to go to a different pharmacy. Placed call to [facility] at [phone] and spoke w/ Meagan. Rx canceled. No further info needed and call was ended.

## 2019-03-12 NOTE — PROGRESS NOTES
"Subjective:       Patient ID: Hadley Fulton is a 38 y.o. male.    Chief Complaint: Follow-up (Patient is here to follow up after having labs done ) and Edema (says his bilateral leg edema is the same )    Stress echo normal.  Labs fairly unremarkable.  Continues to have episodic dizzy spells every few days, only last for about a minute or so.  No identified triggers.  Does not seem to be positional.  No associated nausea or vomiting.  Has chronic tinnitus, no change in character.  No hearing loss.  No otalgia.  Also complains of chronic insomnia.  Takes up to 15 mg of Ambien at night, but still has difficulty sleeping.      Review of Systems   Constitutional: Negative for chills, fatigue and fever.   HENT: Positive for tinnitus. Negative for congestion.    Eyes: Negative for visual disturbance.   Respiratory: Negative for cough and shortness of breath.    Cardiovascular: Negative for chest pain.   Gastrointestinal: Negative for abdominal pain, nausea and vomiting.   Genitourinary: Negative for difficulty urinating.   Musculoskeletal: Negative for arthralgias.   Skin: Negative for rash.   Neurological: Positive for dizziness. Negative for seizures, syncope and weakness.   Psychiatric/Behavioral: Positive for sleep disturbance. Negative for agitation.       Objective:      Vitals:    03/12/19 1543 03/12/19 1617 03/12/19 1618 03/12/19 1619   BP: 126/82 125/80 120/81 129/84   BP Location: Left arm Left arm Left arm Left arm   Patient Position: Sitting Lying Sitting Standing   BP Method: Large (Automatic) Large (Automatic) Large (Automatic) Large (Automatic)   Pulse: (!) 59 62 67 85   Resp: 16      Temp: 98.4 °F (36.9 °C)      TempSrc: Oral      SpO2: 96%      Weight: 106.6 kg (235 lb)      Height: 6' 1" (1.854 m)        Lab Results   Component Value Date    WBC 8.60 02/27/2019    HGB 14.9 02/27/2019    HCT 44.0 02/27/2019     02/27/2019    CHOL 176 02/27/2019    TRIG 91 02/27/2019    HDL 51 02/27/2019    " ALT 25 02/27/2019    AST 29 02/27/2019     02/27/2019    K 4.3 02/27/2019     02/27/2019    CREATININE 0.9 02/27/2019    BUN 8 02/27/2019    CO2 32 (H) 02/27/2019    TSH 1.67 02/27/2019     Physical Exam   Constitutional: He is oriented to person, place, and time. He appears well-developed and well-nourished.   HENT:   Head: Normocephalic and atraumatic.   Mouth/Throat: Oropharynx is clear and moist.   Eyes: EOM are normal. Pupils are equal, round, and reactive to light.   Neck: Neck supple. No JVD present. Carotid bruit is not present.   Cardiovascular: Normal rate, regular rhythm and normal heart sounds.   Pulses:       Radial pulses are 2+ on the right side, and 2+ on the left side.   Pulmonary/Chest: Effort normal and breath sounds normal.   Abdominal: Soft. Bowel sounds are normal. There is no tenderness.   Musculoskeletal: He exhibits no edema.   Neurological: He is alert and oriented to person, place, and time.   Skin: Skin is warm and dry.   Psychiatric: He has a normal mood and affect. His behavior is normal.   Nursing note and vitals reviewed.      Assessment:       1. Dizziness    2. Primary insomnia    3. Chronic pain syndrome        Plan:       Dizziness  No clear etiology, but cardiovascular workup unremarkable.  Primary insomnia  -     Discontinue: amitriptyline (ELAVIL) 10 MG tablet; Take 1-2 tablets (10-20 mg total) by mouth nightly as needed for Insomnia.  Dispense: 30 tablet; Refill: 0  -     amitriptyline (ELAVIL) 10 MG tablet; Take 1-2 tablets (10-20 mg total) by mouth nightly as needed for Insomnia.  Dispense: 30 tablet; Refill: 0  Titrate Elavil slowly  Chronic pain syndrome      Medication List with Changes/Refills   New Medications    AMITRIPTYLINE (ELAVIL) 10 MG TABLET    Take 1-2 tablets (10-20 mg total) by mouth nightly as needed for Insomnia.   Current Medications    BUPRENORPHINE-NALOXONE (SUBOXONE) 8-2 MG FILM    Place under the tongue.    CLINDAMYCIN PHOSPHATE 1%  (CLINDAGEL) 1 % GEL    APPLY TO AFFECTED AREAS DAILY AS NEEDED    CLOBETASOL (TEMOVATE) 0.05 % CREAM    APPLY TO AFFECTED AREAS OF SKIN DAILY AS NEEDED FOR ITCHING    ZOLPIDEM (AMBIEN) 10 MG TAB    Take 5 mg by mouth nightly as needed.   Discontinued Medications    CLINDAMYCIN (CLEOCIN T) 1 % EXTERNAL SOLUTION    APPLY TO AFFECTED AREAS OF SKIN DAILY AS NEEDED

## 2019-03-20 ENCOUNTER — TELEPHONE (OUTPATIENT)
Dept: PRIMARY CARE CLINIC | Facility: CLINIC | Age: 39
End: 2019-03-20

## 2019-03-20 NOTE — TELEPHONE ENCOUNTER
----- Message from Chelsea Singh sent at 3/20/2019  4:37 PM CDT -----  Contact: self   Patient need to speak to nurse regarding patient need a referral to see neurologist that he seen in the past due to old injure/procedure per patient       The neurologist is from [facility] per patient       Patient will like to know do he need to schedule appt with Dr. Delgado before he could get referral       Please fax to [phone] fax number to neurologist   Phone number to office is [phone]      Please call patient at [phone]

## 2019-03-21 NOTE — TELEPHONE ENCOUNTER
Tried calling patient to see exactly what he needed to see neuro for so I can add a dx to the referral but he did not answer. Message left for him to return my call

## 2020-10-05 ENCOUNTER — PATIENT MESSAGE (OUTPATIENT)
Dept: ADMINISTRATIVE | Facility: HOSPITAL | Age: 40
End: 2020-10-05

## 2020-11-27 ENCOUNTER — TELEPHONE (OUTPATIENT)
Dept: PRIMARY CARE CLINIC | Facility: CLINIC | Age: 40
End: 2020-11-27

## 2020-11-27 DIAGNOSIS — Z20.822 EXPOSURE TO COVID-19 VIRUS: Primary | ICD-10-CM

## 2020-11-27 NOTE — TELEPHONE ENCOUNTER
----- Message from Angela Mays sent at 11/27/2020  2:54 PM CST -----  Contact: [phone]  Patient called, stated that he was talking to the medical assistant and call got drop. Please call and advise. Thank you

## 2020-11-27 NOTE — TELEPHONE ENCOUNTER
----- Message from Meg Dominique sent at 11/27/2020  2:24 PM CST -----  Contact: Patient, [phone]  Calling to ask for a Covid 19 screening test ordered, was exposed. Please advise. Thanks.

## 2021-01-04 ENCOUNTER — PATIENT MESSAGE (OUTPATIENT)
Dept: ADMINISTRATIVE | Facility: HOSPITAL | Age: 41
End: 2021-01-04

## 2021-04-05 ENCOUNTER — PATIENT MESSAGE (OUTPATIENT)
Dept: ADMINISTRATIVE | Facility: HOSPITAL | Age: 41
End: 2021-04-05

## 2021-07-06 ENCOUNTER — PATIENT MESSAGE (OUTPATIENT)
Dept: ADMINISTRATIVE | Facility: HOSPITAL | Age: 41
End: 2021-07-06

## 2021-10-05 ENCOUNTER — PATIENT MESSAGE (OUTPATIENT)
Dept: ADMINISTRATIVE | Facility: HOSPITAL | Age: 41
End: 2021-10-05

## 2022-01-21 ENCOUNTER — PATIENT MESSAGE (OUTPATIENT)
Dept: ADMINISTRATIVE | Facility: HOSPITAL | Age: 42
End: 2022-01-21
Payer: COMMERCIAL

## 2023-06-23 ENCOUNTER — TELEPHONE (OUTPATIENT)
Dept: PRIMARY CARE CLINIC | Facility: CLINIC | Age: 43
End: 2023-06-23
Payer: COMMERCIAL

## 2023-06-23 NOTE — TELEPHONE ENCOUNTER
----- Message from Jackeline Madsen sent at 6/23/2023 12:37 PM CDT -----  Contact: Pt [phone]  Patient is running a few minutes behind / traffic    Please call and advise.    Thank You

## 2023-06-26 ENCOUNTER — PATIENT MESSAGE (OUTPATIENT)
Dept: UROLOGY | Facility: CLINIC | Age: 43
End: 2023-06-26

## 2023-06-26 ENCOUNTER — OFFICE VISIT (OUTPATIENT)
Dept: UROLOGY | Facility: CLINIC | Age: 43
End: 2023-06-26
Payer: COMMERCIAL

## 2023-06-26 VITALS — RESPIRATION RATE: 16 BRPM | WEIGHT: 228.5 LBS | HEIGHT: 73 IN | BODY MASS INDEX: 30.28 KG/M2

## 2023-06-26 DIAGNOSIS — E29.1 HYPOGONADISM IN MALE: Primary | ICD-10-CM

## 2023-06-26 PROCEDURE — 99204 OFFICE O/P NEW MOD 45 MIN: CPT | Mod: S$GLB,,, | Performed by: NURSE PRACTITIONER

## 2023-06-26 PROCEDURE — 99999 PR PBB SHADOW E&M-EST. PATIENT-LVL III: CPT | Mod: PBBFAC,,, | Performed by: NURSE PRACTITIONER

## 2023-06-26 PROCEDURE — 99999 PR PBB SHADOW E&M-EST. PATIENT-LVL III: ICD-10-PCS | Mod: PBBFAC,,, | Performed by: NURSE PRACTITIONER

## 2023-06-26 PROCEDURE — 99204 PR OFFICE/OUTPT VISIT, NEW, LEVL IV, 45-59 MIN: ICD-10-PCS | Mod: S$GLB,,, | Performed by: NURSE PRACTITIONER

## 2023-06-26 RX ORDER — TESTOSTERONE CYPIONATE 200 MG/ML
200 INJECTION, SOLUTION INTRAMUSCULAR
Qty: 10 ML | Refills: 2 | Status: SHIPPED | OUTPATIENT
Start: 2023-06-26 | End: 2024-01-22 | Stop reason: SDUPTHER

## 2023-06-26 RX ORDER — DEXTROAMPHETAMINE SACCHARATE, AMPHETAMINE ASPARTATE, DEXTROAMPHETAMINE SULFATE AND AMPHETAMINE SULFATE 5; 5; 5; 5 MG/1; MG/1; MG/1; MG/1
TABLET ORAL
COMMUNITY
Start: 2023-06-23

## 2023-06-26 RX ORDER — TESTOSTERONE CYPIONATE 200 MG/ML
200 INJECTION, SOLUTION INTRAMUSCULAR
COMMUNITY
Start: 2023-06-01 | End: 2023-06-26 | Stop reason: SDUPTHER

## 2023-06-26 NOTE — PROGRESS NOTES
"Subjective:      Hadley Fulton is a 42 y.o. male who presents for evaluation of hypogonadism.      Hypogonadism  Currently self-injecting 200 mg testosterone weekly. Reports complete resolutions of low T symptoms. Been on TRT for about 2 years. Most recent T level was 6/14: 139; 4/22: 158.  Denies SE; wishes to continue current dosage.       The following portions of the patient's history were reviewed and updated as appropriate: allergies, current medications, past family history, past medical history, past social history, past surgical history and problem list.    Review of Systems  Constitutional: no fever or chills  ENT: no nasal congestion or sore throat  Respiratory: no cough or shortness of breath  Cardiovascular: no chest pain or palpitations  Gastrointestinal: no nausea or vomiting, tolerating diet  Genitourinary: as per HPI  Hematologic/Lymphatic: no easy bruising or lymphadenopathy  Musculoskeletal: no arthralgias or myalgias  Neurological: no seizures or tremors  Behavioral/Psych: no auditory or visual hallucinations     Objective:   Vitals: Resp 16   Ht 6' 1" (1.854 m)   Wt 103.7 kg (228 lb 8.1 oz)   BMI 30.15 kg/m²     Physical Exam   General: alert and oriented, no acute distress  Head: normocephalic, atraumatic  Neck: supple, no lymphadenopathy, normal ROM, no masses  Respiratory: Symmetric expansion, non-labored breathing  Cardiovascular: regular rate and rhythm, nomal pulses, no peripheral edema  Abdomen: soft, non tender, non distended  Skin: normal coloration and turgor, no rashes, no suspicious skin lesions noted  Neuro: alert and oriented x3, no gross deficits  Psych: normal judgment and insight, normal mood/affect, and non-anxious    Physical Exam    Lab Review   Urinalysis demonstrates no specimen   Lab Results   Component Value Date    WBC 8.60 02/27/2019    HGB 14.9 02/27/2019    HCT 44.0 02/27/2019    MCV 82 02/27/2019     02/27/2019     Lab Results   Component Value Date "    CREATININE 0.9 02/27/2019    BUN 8 02/27/2019         Assessment and Plan:   1. Hypogonadism in male  - testosterone cypionate (DEPOTESTOTERONE CYPIONATE) 200 mg/mL injection; Inject 1 mL (200 mg total) into the muscle every 7 days.  Dispense: 10 mL; Refill: 2  - CBC Auto Differential; Standing  - Testosterone; Standing  - Prostate Specific Antigen, Diagnostic; Future     --labs today (pt reported last shot Friday  --Refill sent to pharmacy  --repeat labs in 6 months  --RTC in 1 year; sooner for new or worsening symptoms     This note is dictated on M*Modal word recognition program.  There are word recognition mistakes that are occasionally missed on review.

## 2023-09-18 ENCOUNTER — PATIENT MESSAGE (OUTPATIENT)
Dept: PRIMARY CARE CLINIC | Facility: CLINIC | Age: 43
End: 2023-09-18
Payer: COMMERCIAL

## 2023-10-18 ENCOUNTER — PATIENT MESSAGE (OUTPATIENT)
Dept: CARDIOLOGY | Facility: CLINIC | Age: 43
End: 2023-10-18
Payer: COMMERCIAL

## 2023-11-30 NOTE — PROGRESS NOTES
"Subjective:      Hadley Fulton is a 43 y.o. male who returns today regarding his TRT.    Hypogonadism  Currently self-injecting 200 mg testosterone weekly. Reports complete resolutions of low T symptoms. Been on TRT for about 2 years. Most recent T level was 6/26/23: >1500. Pt believes that his T level was elevated due to exposure from topical testosterone used by friend.   Denies SE; wishes to continue current dosage.     He also reports difficulty maintaining erection.  Denies having this problem previously.  Denies increase stress/new medications.     The following portions of the patient's history were reviewed and updated as appropriate: allergies, current medications, past family history, past medical history, past social history, past surgical history and problem list.    Review of Systems  A comprehensive multipoint review of systems was negative except as otherwise stated in the HPI.     Objective:   Vitals: BP (!) 153/91 (BP Location: Right arm, Patient Position: Sitting, BP Method: Medium (Manual))   Pulse 93   Ht 6' 1" (1.854 m)   Wt 109.7 kg (241 lb 13.5 oz)   BMI 31.91 kg/m²     Physical Exam   General: alert and oriented, no acute distress  Respiratory: Symmetric expansion, non-labored breathing  Cardiovascular: regular rate and rhythm, no peripheral edema  Abdomen: soft, non distended  Skin: normal coloration and turgor, no rashes, no suspicious skin lesions noted  Neuro: no gross deficits  Psych: normal judgment and insight, normal mood/affect, and non-anxious    Lab Review   Urinalysis demonstrates no ua   Lab Results   Component Value Date    WBC 9.91 12/04/2023    HGB 18.1 (H) 12/04/2023    HCT 51.4 12/04/2023    MCV 82 12/04/2023     12/04/2023     Lab Results   Component Value Date    CREATININE 0.9 02/27/2019    BUN 8 02/27/2019     Lab Results   Component Value Date    PSADIAG 1.1 06/26/2023       Assessment and Plan:   1. Hypogonadism in male  --continue current dosage of " testosterone   --due for labs; may consider lowering dosage if testosterone remains out of range    2. Erectile dysfunction due to diseases classified elsewhere  Reviewed pathophysiology and differential diagnosis of erectile dysfunction with the patient. Treatment options, including relative risks and benefits, were discussed. All questions were answered.  - HEMOGLOBIN A1C; Future  - TSH; Future  --trial of Cialis    --labs now and repeat in 6 months  --rtc in 1 year; sooner for new or worsening symptoms     This note is dictated on M*Modal word recognition program.  There are word recognition mistakes that are occasionally missed on review.

## 2023-12-04 ENCOUNTER — OFFICE VISIT (OUTPATIENT)
Dept: UROLOGY | Facility: CLINIC | Age: 43
End: 2023-12-04
Payer: COMMERCIAL

## 2023-12-04 VITALS
HEART RATE: 93 BPM | DIASTOLIC BLOOD PRESSURE: 91 MMHG | HEIGHT: 73 IN | BODY MASS INDEX: 32.06 KG/M2 | SYSTOLIC BLOOD PRESSURE: 153 MMHG | WEIGHT: 241.88 LBS

## 2023-12-04 DIAGNOSIS — N52.1 ERECTILE DYSFUNCTION DUE TO DISEASES CLASSIFIED ELSEWHERE: ICD-10-CM

## 2023-12-04 DIAGNOSIS — E29.1 HYPOGONADISM IN MALE: Primary | ICD-10-CM

## 2023-12-04 PROCEDURE — 99214 OFFICE O/P EST MOD 30 MIN: CPT | Mod: S$GLB,,, | Performed by: NURSE PRACTITIONER

## 2023-12-04 PROCEDURE — 99999 PR PBB SHADOW E&M-EST. PATIENT-LVL III: CPT | Mod: PBBFAC,,, | Performed by: NURSE PRACTITIONER

## 2023-12-04 PROCEDURE — 99999 PR PBB SHADOW E&M-EST. PATIENT-LVL III: ICD-10-PCS | Mod: PBBFAC,,, | Performed by: NURSE PRACTITIONER

## 2023-12-04 PROCEDURE — 99214 PR OFFICE/OUTPT VISIT, EST, LEVL IV, 30-39 MIN: ICD-10-PCS | Mod: S$GLB,,, | Performed by: NURSE PRACTITIONER

## 2023-12-04 RX ORDER — TADALAFIL 20 MG/1
20 TABLET ORAL
Qty: 20 TABLET | Refills: 11 | Status: SHIPPED | OUTPATIENT
Start: 2023-12-04 | End: 2023-12-20 | Stop reason: SDUPTHER

## 2023-12-06 ENCOUNTER — TELEPHONE (OUTPATIENT)
Dept: UROLOGY | Facility: CLINIC | Age: 43
End: 2023-12-06
Payer: COMMERCIAL

## 2023-12-06 NOTE — TELEPHONE ENCOUNTER
Informed patient that on top of page that was sent via portal is some blood center that will do the therapeutic phlebotomy. Answered all of patient questions.  Patient verbalized understanding.    THOR Dempsey

## 2023-12-06 NOTE — TELEPHONE ENCOUNTER
----- Message from Hanna Goyal sent at 12/6/2023 12:36 PM CST -----  Regarding: pt advice  Contact: pt [phone]  Pt calling to advise blood drive does not do therapeutic  blood draw. Pt needs another way to donate. Pls call

## 2023-12-06 NOTE — TELEPHONE ENCOUNTER
Called patient to inform him the lab req will be uploaded to chart.  Advised patient to give us a call or send us a message if he has any questions or concerns.  Answered all of patient questions.  Patient verbalized understanding.    THOR Dempsey

## 2023-12-07 ENCOUNTER — PATIENT MESSAGE (OUTPATIENT)
Dept: UROLOGY | Facility: CLINIC | Age: 43
End: 2023-12-07
Payer: COMMERCIAL

## 2023-12-11 NOTE — TELEPHONE ENCOUNTER
It is an as needed medication that he should take every 3 days.  He can increase to every other day or every day to see if it helps treat his symptoms.  When he is out of a refill all he needs to do is call the pharmacy.  The amount and frequency is correct on the prescription.  He does not need to take the medication every 3 days if he needs to increase the amount dispense then he can reach back out to the office.  Thanks  MOHAMUD

## 2023-12-20 DIAGNOSIS — E29.1 HYPOGONADISM IN MALE: ICD-10-CM

## 2023-12-20 DIAGNOSIS — N52.1 ERECTILE DYSFUNCTION DUE TO DISEASES CLASSIFIED ELSEWHERE: ICD-10-CM

## 2023-12-20 RX ORDER — TADALAFIL 20 MG/1
20 TABLET ORAL
Qty: 20 TABLET | Refills: 11 | Status: SHIPPED | OUTPATIENT
Start: 2023-12-20

## 2023-12-27 ENCOUNTER — PATIENT MESSAGE (OUTPATIENT)
Dept: UROLOGY | Facility: CLINIC | Age: 43
End: 2023-12-27
Payer: COMMERCIAL

## 2024-01-19 DIAGNOSIS — E29.1 HYPOGONADISM IN MALE: ICD-10-CM

## 2024-01-22 ENCOUNTER — PATIENT MESSAGE (OUTPATIENT)
Dept: UROLOGY | Facility: CLINIC | Age: 44
End: 2024-01-22
Payer: COMMERCIAL

## 2024-01-22 DIAGNOSIS — E29.1 HYPOGONADISM IN MALE: ICD-10-CM

## 2024-01-22 RX ORDER — TESTOSTERONE CYPIONATE 200 MG/ML
INJECTION, SOLUTION INTRAMUSCULAR
Qty: 10 ML | OUTPATIENT
Start: 2024-01-22

## 2024-01-22 RX ORDER — TESTOSTERONE CYPIONATE 200 MG/ML
200 INJECTION, SOLUTION INTRAMUSCULAR
Qty: 10 ML | Refills: 1 | Status: SHIPPED | OUTPATIENT
Start: 2024-01-22

## 2024-09-19 ENCOUNTER — PATIENT MESSAGE (OUTPATIENT)
Dept: PRIMARY CARE CLINIC | Facility: CLINIC | Age: 44
End: 2024-09-19
Payer: COMMERCIAL

## 2024-10-08 ENCOUNTER — PATIENT MESSAGE (OUTPATIENT)
Dept: UROLOGY | Facility: CLINIC | Age: 44
End: 2024-10-08
Payer: COMMERCIAL

## 2024-10-08 DIAGNOSIS — E29.1 HYPOGONADISM IN MALE: Primary | ICD-10-CM

## 2024-11-04 ENCOUNTER — PATIENT MESSAGE (OUTPATIENT)
Dept: RESEARCH | Facility: HOSPITAL | Age: 44
End: 2024-11-04
Payer: COMMERCIAL

## 2024-11-06 ENCOUNTER — LAB VISIT (OUTPATIENT)
Dept: LAB | Facility: HOSPITAL | Age: 44
End: 2024-11-06
Attending: NURSE PRACTITIONER
Payer: COMMERCIAL

## 2024-11-06 DIAGNOSIS — E29.1 HYPOGONADISM IN MALE: ICD-10-CM

## 2024-11-06 LAB
BASOPHILS # BLD AUTO: 0.04 K/UL (ref 0–0.2)
BASOPHILS NFR BLD: 0.5 % (ref 0–1.9)
DIFFERENTIAL METHOD BLD: ABNORMAL
EOSINOPHIL # BLD AUTO: 0.2 K/UL (ref 0–0.5)
EOSINOPHIL NFR BLD: 1.7 % (ref 0–8)
ERYTHROCYTE [DISTWIDTH] IN BLOOD BY AUTOMATED COUNT: 13.4 % (ref 11.5–14.5)
HCT VFR BLD AUTO: 51 % (ref 40–54)
HGB BLD-MCNC: 17.1 G/DL (ref 14–18)
IMM GRANULOCYTES # BLD AUTO: 0.09 K/UL (ref 0–0.04)
IMM GRANULOCYTES NFR BLD AUTO: 1 % (ref 0–0.5)
LYMPHOCYTES # BLD AUTO: 2.9 K/UL (ref 1–4.8)
LYMPHOCYTES NFR BLD: 33.2 % (ref 18–48)
MCH RBC QN AUTO: 30 PG (ref 27–31)
MCHC RBC AUTO-ENTMCNC: 33.5 G/DL (ref 32–36)
MCV RBC AUTO: 90 FL (ref 82–98)
MONOCYTES # BLD AUTO: 0.7 K/UL (ref 0.3–1)
MONOCYTES NFR BLD: 7.8 % (ref 4–15)
NEUTROPHILS # BLD AUTO: 4.9 K/UL (ref 1.8–7.7)
NEUTROPHILS NFR BLD: 55.8 % (ref 38–73)
NRBC BLD-RTO: 0 /100 WBC
PLATELET # BLD AUTO: 240 K/UL (ref 150–450)
PMV BLD AUTO: 10.6 FL (ref 9.2–12.9)
RBC # BLD AUTO: 5.7 M/UL (ref 4.6–6.2)
TESTOST SERPL-MCNC: 335 NG/DL (ref 304–1227)
WBC # BLD AUTO: 8.7 K/UL (ref 3.9–12.7)

## 2024-11-06 PROCEDURE — 85025 COMPLETE CBC W/AUTO DIFF WBC: CPT | Performed by: NURSE PRACTITIONER

## 2024-11-06 PROCEDURE — 84403 ASSAY OF TOTAL TESTOSTERONE: CPT | Performed by: NURSE PRACTITIONER

## 2024-11-06 PROCEDURE — 36415 COLL VENOUS BLD VENIPUNCTURE: CPT | Mod: PO | Performed by: NURSE PRACTITIONER

## 2024-11-18 NOTE — PROGRESS NOTES
"Subjective:      Hadley Fulton is a 44 y.o. male who returns today regarding his TRT.    Hypogonadism  Previously self injecting 200 mg weekly and developed secondary polycythemia.  He has been compliant with therapeutic phlebotomy when indicated by blood work.  He was unable to follow-up in the office for testosterone refills due to hectic work schedule.     He also reports difficulty maintaining erection.  Denies having this problem previously.  Denies increase stress/new medications.   The following portions of the patient's history were reviewed and updated as appropriate: allergies, current medications, past family history, past medical history, past social history, past surgical history and problem list.    Review of Systems  A comprehensive multipoint review of systems was negative except as otherwise stated in the HPI.     Objective:   Vitals: /87 (BP Location: Left arm, Patient Position: Sitting)   Pulse 107   Ht 6' 1" (1.854 m)   Wt 109.6 kg (241 lb 11.8 oz)   BMI 31.89 kg/m²     Physical Exam   General: alert and oriented, no acute distress  Respiratory: Symmetric expansion, non-labored breathing  Cardiovascular: regular rate and rhythm, no peripheral edema  Abdomen: soft, non distended  Skin: normal coloration and turgor, no rashes, no suspicious skin lesions noted  Neuro: no gross deficits  Psych: normal judgment and insight, normal mood/affect, and non-anxious    Lab Review   Urinalysis demonstrates no ua   Lab Results   Component Value Date    WBC 8.70 11/06/2024    HGB 17.1 11/06/2024    HCT 51.0 11/06/2024    MCV 90 11/06/2024     11/06/2024     Lab Results   Component Value Date    CREATININE 0.9 02/27/2019    BUN 8 02/27/2019     Lab Results   Component Value Date    PSADIAG 1.1 06/26/2023         Assessment and Plan:   1. Hypogonadism in male  --we had a lengthy discussion about continuing testosterone replacement therapy in the setting secondary polycythemia.  He is " agreeable to continuing therapeutic phlebotomy however he expresses some concern over elevated blood counts while on testosterone replacement.  We discussed alternatives to injection therapy such as off-label use of Clomid which is an oral pill that can be taken every other day to help boost testosterone levels.  Patient is in agreement with trying Clomid.  He will take every other day for 3 months.  We will repeat labs and reassess symptoms at that time.  If polycythemia remains will refer to hematology    - clomiPHENE (CLOMID) 50 mg tablet; Take 1 tablet (50 mg total) by mouth once daily. for 10 days  Dispense: 15 tablet; Refill: 3    2. Erectile dysfunction due to diseases classified elsewhere  - tadalafiL (CIALIS) 20 MG Tab; Take 1 tablet (20 mg total) by mouth as needed (take every 3 days PRN ED).  Dispense: 20 tablet; Refill: 11         This note is dictated on M*Modal word recognition program.  There are word recognition mistakes that are occasionally missed on review.

## 2024-11-19 ENCOUNTER — OFFICE VISIT (OUTPATIENT)
Dept: UROLOGY | Facility: CLINIC | Age: 44
End: 2024-11-19
Payer: COMMERCIAL

## 2024-11-19 VITALS
SYSTOLIC BLOOD PRESSURE: 135 MMHG | HEIGHT: 73 IN | DIASTOLIC BLOOD PRESSURE: 87 MMHG | WEIGHT: 241.75 LBS | BODY MASS INDEX: 32.04 KG/M2 | HEART RATE: 107 BPM

## 2024-11-19 DIAGNOSIS — E29.1 HYPOGONADISM IN MALE: Primary | ICD-10-CM

## 2024-11-19 DIAGNOSIS — N52.1 ERECTILE DYSFUNCTION DUE TO DISEASES CLASSIFIED ELSEWHERE: ICD-10-CM

## 2024-11-19 PROCEDURE — 99999 PR PBB SHADOW E&M-EST. PATIENT-LVL III: CPT | Mod: PBBFAC,,, | Performed by: NURSE PRACTITIONER

## 2024-11-19 PROCEDURE — 99214 OFFICE O/P EST MOD 30 MIN: CPT | Mod: S$GLB,,, | Performed by: NURSE PRACTITIONER

## 2024-11-19 RX ORDER — TADALAFIL 20 MG/1
20 TABLET ORAL
Qty: 20 TABLET | Refills: 11 | Status: SHIPPED | OUTPATIENT
Start: 2024-11-19

## 2024-11-19 RX ORDER — CLOMIPHENE CITRATE 50 MG/1
50 TABLET ORAL DAILY
Qty: 15 TABLET | Refills: 3 | Status: SHIPPED | OUTPATIENT
Start: 2024-11-19 | End: 2024-11-29

## 2024-12-04 ENCOUNTER — PATIENT MESSAGE (OUTPATIENT)
Dept: UROLOGY | Facility: CLINIC | Age: 44
End: 2024-12-04
Payer: COMMERCIAL

## 2024-12-09 DIAGNOSIS — E29.1 HYPOGONADISM IN MALE: Primary | ICD-10-CM

## 2024-12-09 RX ORDER — CLOMIPHENE CITRATE 50 MG/1
50 TABLET ORAL EVERY OTHER DAY
Qty: 15 TABLET | Refills: 5 | Status: SHIPPED | OUTPATIENT
Start: 2024-12-09 | End: 2024-12-10 | Stop reason: SDUPTHER

## 2024-12-10 DIAGNOSIS — E29.1 HYPOGONADISM IN MALE: ICD-10-CM

## 2024-12-10 RX ORDER — CLOMIPHENE CITRATE 50 MG/1
50 TABLET ORAL EVERY OTHER DAY
Qty: 15 TABLET | Refills: 5 | Status: SHIPPED | OUTPATIENT
Start: 2024-12-10 | End: 2025-01-09

## 2024-12-25 ENCOUNTER — PATIENT MESSAGE (OUTPATIENT)
Dept: UROLOGY | Facility: CLINIC | Age: 44
End: 2024-12-25
Payer: COMMERCIAL

## 2025-01-03 ENCOUNTER — PATIENT MESSAGE (OUTPATIENT)
Dept: UROLOGY | Facility: CLINIC | Age: 45
End: 2025-01-03
Payer: COMMERCIAL

## 2025-01-07 DIAGNOSIS — E29.1 HYPOGONADISM IN MALE: Primary | ICD-10-CM

## 2025-01-07 RX ORDER — NEEDLES, DISPOSABLE 25GX5/8"
1 NEEDLE, DISPOSABLE MISCELLANEOUS ONCE
Qty: 1 EACH | Refills: 1 | Status: SHIPPED | OUTPATIENT
Start: 2025-01-07 | End: 2025-01-07

## 2025-01-07 RX ORDER — TESTOSTERONE CYPIONATE 200 MG/ML
100 INJECTION, SOLUTION INTRAMUSCULAR
Qty: 2 ML | Refills: 5 | Status: SHIPPED | OUTPATIENT
Start: 2025-01-07 | End: 2025-07-08

## 2025-01-07 RX ORDER — NEEDLES, DISPOSABLE 27GX1/2"
1 NEEDLE, DISPOSABLE MISCELLANEOUS WEEKLY
Qty: 1 EACH | Refills: 1 | Status: SHIPPED | OUTPATIENT
Start: 2025-01-07

## 2025-01-24 ENCOUNTER — TELEPHONE (OUTPATIENT)
Dept: UROLOGY | Facility: CLINIC | Age: 45
End: 2025-01-24
Payer: COMMERCIAL

## 2025-01-24 ENCOUNTER — OFFICE VISIT (OUTPATIENT)
Dept: URGENT CARE | Facility: CLINIC | Age: 45
End: 2025-01-24
Payer: OTHER MISCELLANEOUS

## 2025-01-24 VITALS
HEART RATE: 88 BPM | SYSTOLIC BLOOD PRESSURE: 143 MMHG | WEIGHT: 252.88 LBS | RESPIRATION RATE: 20 BRPM | TEMPERATURE: 98 F | HEIGHT: 73 IN | DIASTOLIC BLOOD PRESSURE: 91 MMHG | OXYGEN SATURATION: 96 % | BODY MASS INDEX: 33.51 KG/M2

## 2025-01-24 DIAGNOSIS — R20.2 PARESTHESIAS IN LEFT HAND: ICD-10-CM

## 2025-01-24 DIAGNOSIS — S53.402A ELBOW SPRAIN, LEFT, INITIAL ENCOUNTER: ICD-10-CM

## 2025-01-24 DIAGNOSIS — Z02.6 ENCOUNTER RELATED TO WORKER'S COMPENSATION CLAIM: Primary | ICD-10-CM

## 2025-01-24 PROCEDURE — 73080 X-RAY EXAM OF ELBOW: CPT | Mod: LT,S$GLB,, | Performed by: RADIOLOGY

## 2025-01-24 RX ORDER — METHYLPREDNISOLONE 4 MG/1
TABLET ORAL
Qty: 21 EACH | Refills: 0 | Status: SHIPPED | OUTPATIENT
Start: 2025-01-24 | End: 2025-01-31 | Stop reason: ALTCHOICE

## 2025-01-24 NOTE — LETTER
[facility] [address] [address]  Phone: [phone]  Fax: [phone]  [facility]: [id number]    Pt Name: Hadley Fulton  Injury Date: 01/23/2025   Employee ID: [id number] Date of First Treatment: 01/24/2025   Company: [employer]      Appointment Time: 12:05 PM Arrived: 12:30 PM   Provider: Rocky Talbot MD Time Out: 1:25 PM     Office Treatment:   1. Encounter related to worker's compensation claim    2. Elbow sprain, left, initial encounter    3. Paresthesias in left hand      Medications Ordered This Encounter   Medications    methylPREDNISolone (MEDROL DOSEPACK) 4 mg tablet      Patient Instructions: Attention not to aggravate affected area, Apply ice 24-48 hours then apply heat/warm soaks, Elevated affected area      Restrictions: No lifting/pushing/pulling more than 10 lbs, Limited use of left hand and arm     Return Appointment: 1/31/2025 at 8:30 AM    SHARON

## 2025-01-24 NOTE — PROGRESS NOTES
Subjective:      Patient ID: Hadley Fulton is a 44 y.o. male.    Chief Complaint: Elbow Pain (LT ELBOW, LT HAND)    Patient's place of employment - [employer]  Patient's job title -    Date of injury - 1/23/2025  Body part injured including left or right - LT ELBOW, NUMBNESS AND TINGLINGIN LT HAND  Injury Mechanism - GRABBED RAIL ON THE TRUCK WITH LT HAND TO AVOID SLIPPING AND FALLING ON ICE    What they were doing when they got hurt - WORKING   What they did immediately after - REST  Pain scale right now - 4/10    KSD    PATIENT IS A 44-YEAR-OLD MALE WHO WAS ATTEMPTING TO AVOID FALLING ON THE ICE DUE TO RECENT SNOW STORM AND GRAB A HAND RAIL WITH HIS LEFT UPPER EXTREMITY AND FELT A MILD PAIN TO THE LEFT MEDIAL ELBOW.  THAT PAIN WAS MILD AT 1ST HOWEVER OVER THE LAST 24 HOURS HAS BECOME MODERATE WITH ASSOCIATED TENDERNESS MEDIALLY AND SOME PARESTHESIAS TO THE LEFT 4TH AND 5TH DIGITS IN THE ULNAR DISTRIBUTION.  NO SHOULDER PAIN NO NECK PAIN HOWEVER THE PAIN RADIATES PROXIMALLY AND DISTALLY FROM THE ELBOW.  X-RAY PERFORMED SHOWING NO ACUTE BONY INJURY.  DO FEEL THAT THIS IS A SPRAIN WITH SWELLING AND PARESTHESIAS POTENTIALLY FROM LOCAL EDEMA ABUTTING THE ULNAR NERVE.  WILL ENCOURAGE REST, ICE, ELEVATION, MEDROL DOSEPAK, LIGHT DUTY LIFTING RESTRICTIONS WITH LEFT UPPER EXTREMITY AND RETURN TO CLINIC IN 1 WEEK.    Elbow Pain  Associated symptoms include arthralgias and numbness. Pertinent negatives include no chest pain, chills, coughing, fever, joint swelling, neck pain or sore throat.         ROS  Constitution: Negative for chills and fever.   HENT:  Negative for postnasal drip, sinus pain and sore throat.    Neck: Negative for neck pain and neck stiffness.   Cardiovascular:  Negative for chest pain and palpitations.   Respiratory:  Negative for cough and shortness of breath.    Genitourinary:  Negative for dysuria and hematuria.   Musculoskeletal:  Positive for pain, trauma and joint pain. Negative for  joint swelling and abnormal ROM of joint.   Skin:  Negative for wound and laceration.   Neurological:  Positive for numbness and tingling. Negative for altered mental status.   Psychiatric/Behavioral:  Negative for altered mental status.      Objective:     Physical Exam  Vitals and nursing note reviewed.   Constitutional:       General: He is not in acute distress.     Appearance: Normal appearance. He is well-developed. He is not ill-appearing, toxic-appearing or diaphoretic.   HENT:      Head: Normocephalic and atraumatic.      Jaw: No trismus.      Right Ear: Hearing, tympanic membrane, ear canal and external ear normal.      Left Ear: Hearing, tympanic membrane, ear canal and external ear normal.      Nose: Nose normal. No nasal deformity, mucosal edema or rhinorrhea.      Right Sinus: No maxillary sinus tenderness or frontal sinus tenderness.      Left Sinus: No maxillary sinus tenderness or frontal sinus tenderness.      Mouth/Throat:      Dentition: Normal dentition.      Pharynx: Uvula midline. No posterior oropharyngeal erythema or uvula swelling.   Eyes:      General: Lids are normal. No scleral icterus.        Right eye: No discharge.         Left eye: No discharge.      Conjunctiva/sclera: Conjunctivae normal.      Comments: Sclera clear bilat   Neck:      Trachea: Trachea and phonation normal.   Cardiovascular:      Rate and Rhythm: Normal rate and regular rhythm.      Pulses: Normal pulses.      Heart sounds: Normal heart sounds.   Pulmonary:      Effort: Pulmonary effort is normal. No respiratory distress.      Breath sounds: Normal breath sounds.   Abdominal:      General: Bowel sounds are normal. There is no distension.      Palpations: Abdomen is soft. There is no mass or pulsatile mass.      Tenderness: There is no abdominal tenderness.   Musculoskeletal:         General: Tenderness and signs of injury present. No swelling or deformity. Normal range of motion.      Cervical back: Full passive  range of motion without pain, normal range of motion and neck supple.      Comments: EXAMINATION OF THE LEFT UPPER EXTREMITY SHOWS NO PAIN AT THE SHOULDER OR WRIST HOWEVER SOME MILD TENDERNESS TO PALPATION TO THE MEDIAL ASPECT OF THE ELBOW WITH TRACE LOCAL EDEMA AT THE ULNAR ASPECT OF THE ELBOW AND INTERMITTENT PARESTHESIAS TO THE 4TH AND 5TH DIGIT.  X-RAY NEGATIVE.   Skin:     General: Skin is warm and dry.      Coloration: Skin is not pale.   Neurological:      Mental Status: He is alert and oriented to person, place, and time.      Motor: No abnormal muscle tone.      Coordination: Coordination normal.   Psychiatric:         Speech: Speech normal.         Behavior: Behavior normal. Behavior is cooperative.         Thought Content: Thought content normal.         Judgment: Judgment normal.       XRAY NEGATIVE FOR ACUTE BONY INJURY    X-Ray Elbow Complete Left    Result Date: 1/24/2025  EXAMINATION: XR ELBOW COMPLETE 3 VIEW LEFT CLINICAL HISTORY: TRAUMA/PAIN; Encounter for examination for insurance purposes TECHNIQUE: AP, lateral, and oblique views of the left elbow were performed. COMPARISON: None FINDINGS: No fracture or dislocation.  No bone destruction identified.  No significant joint effusion.     See above Electronically signed by: Jez Holder MD Date:    01/24/2025 Time:    13:47       Assessment:      1. Encounter related to worker's compensation claim    2. Elbow sprain, left, initial encounter    3. Paresthesias in left hand      Plan:   PATIENT IS A 44-YEAR-OLD MALE WHO WAS ATTEMPTING TO AVOID FALLING ON THE ICE DUE TO RECENT SNOW STORM AND GRAB A HAND RAIL WITH HIS LEFT UPPER EXTREMITY AND FELT A MILD PAIN TO THE LEFT MEDIAL ELBOW.  THAT PAIN WAS MILD AT 1ST HOWEVER OVER THE LAST 24 HOURS HAS BECOME MODERATE WITH ASSOCIATED TENDERNESS MEDIALLY AND SOME PARESTHESIAS TO THE LEFT 4TH AND 5TH DIGITS IN THE ULNAR DISTRIBUTION.  NO SHOULDER PAIN NO NECK PAIN HOWEVER THE PAIN RADIATES PROXIMALLY AND DISTALLY  FROM THE ELBOW.  X-RAY PERFORMED SHOWING NO ACUTE BONY INJURY.  DO FEEL THAT THIS IS A SPRAIN WITH SWELLING AND PARESTHESIAS POTENTIALLY FROM LOCAL EDEMA ABUTTING THE ULNAR NERVE.  WILL ENCOURAGE REST, ICE, ELEVATION, MEDROL DOSEPAK, LIGHT DUTY LIFTING RESTRICTIONS WITH LEFT UPPER EXTREMITY AND RETURN TO CLINIC IN 1 WEEK.    Medications Ordered This Encounter   Medications    methylPREDNISolone (MEDROL DOSEPACK) 4 mg tablet     Sig: use as directed     Dispense:  21 each     Refill:  0     Patient Instructions: Attention not to aggravate affected area, Apply ice 24-48 hours then apply heat/warm soaks, Elevated affected area   Restrictions: No lifting/pushing/pulling more than 10 lbs, Limited use of left hand and arm  Follow up in about 1 week (around 1/31/2025).

## 2025-01-31 ENCOUNTER — OFFICE VISIT (OUTPATIENT)
Dept: URGENT CARE | Facility: CLINIC | Age: 45
End: 2025-01-31
Payer: OTHER MISCELLANEOUS

## 2025-01-31 VITALS
SYSTOLIC BLOOD PRESSURE: 139 MMHG | OXYGEN SATURATION: 97 % | TEMPERATURE: 99 F | WEIGHT: 251.31 LBS | DIASTOLIC BLOOD PRESSURE: 96 MMHG | HEART RATE: 103 BPM | RESPIRATION RATE: 20 BRPM | HEIGHT: 73 IN | BODY MASS INDEX: 33.31 KG/M2

## 2025-01-31 DIAGNOSIS — S53.402A ELBOW SPRAIN, LEFT, INITIAL ENCOUNTER: Primary | ICD-10-CM

## 2025-01-31 DIAGNOSIS — R20.2 PARESTHESIAS IN LEFT HAND: ICD-10-CM

## 2025-01-31 DIAGNOSIS — Z02.6 ENCOUNTER RELATED TO WORKER'S COMPENSATION CLAIM: ICD-10-CM

## 2025-01-31 DIAGNOSIS — S56.912A ELBOW STRAIN, LEFT, INITIAL ENCOUNTER: ICD-10-CM

## 2025-01-31 PROCEDURE — 99214 OFFICE O/P EST MOD 30 MIN: CPT | Mod: 25,S$GLB,, | Performed by: STUDENT IN AN ORGANIZED HEALTH CARE EDUCATION/TRAINING PROGRAM

## 2025-01-31 PROCEDURE — 96372 THER/PROPH/DIAG INJ SC/IM: CPT | Mod: S$GLB,,, | Performed by: STUDENT IN AN ORGANIZED HEALTH CARE EDUCATION/TRAINING PROGRAM

## 2025-01-31 RX ORDER — DEXAMETHASONE SODIUM PHOSPHATE 10 MG/ML
10 INJECTION INTRAMUSCULAR; INTRAVENOUS
Status: COMPLETED | OUTPATIENT
Start: 2025-01-31 | End: 2025-01-31

## 2025-01-31 RX ADMIN — DEXAMETHASONE SODIUM PHOSPHATE 10 MG: 10 INJECTION INTRAMUSCULAR; INTRAVENOUS at 09:01

## 2025-01-31 NOTE — LETTER
[facility] [address] [address]  Phone: [phone]  Fax: [phone]  [facility]: [id number]    Pt Name: Hadley Fulton  Injury Date: 01/23/2025   Employee ID: [id number] Date of Treatment: 01/31/2025   Company: [employer]      Appointment Time: 08:15 AM Arrived: 7:59 AM   Provider: Perla Singh MD Time Out: 9:14 AM     Office Treatment:   1. Elbow sprain, left, initial encounter    2. Paresthesias in left hand    3. Elbow strain, left, initial encounter    4. Encounter related to worker's compensation claim      Medications Ordered This Encounter   Medications    dexAMETHasone injection 10 mg      Patient Instructions: Daily home exercises/warm soaks      Restrictions: No lifting/pushing/pulling more than 10 lbs, Limited use of left hand and arm     Return Appointment: 2/7/2025 at 8:45 AM    SHARON

## 2025-01-31 NOTE — PROGRESS NOTES
Subjective:      Patient ID: Hadley Fulton is a 44 y.o. male.    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 Begin MD note:  Mr. Fulton is following up regarding his left elbow pain and paresthesias to left hand after an injury involving him slipping on ice while holding on to a door handle. He says he took the last pill in the prescribed medrol dose oliver today and his pain is now more localized to 2 points on his elbow. He has significant tenderness to the those points and the pain will radiate up his arm to his neck with certain movements. He has numbness in the 4th and 5th digit and the thumb and index finger are tingling. The 3rd digit is hard to differentiate the sensation. He notes weakness in his hand and easy muscle fatigue. Denies hx of prior left elbow injuries or surgeries.     Elbow Pain  Associated symptoms include numbness.       Musculoskeletal:  Positive for pain and abnormal ROM of joint.   Skin:  Negative for erythema.   Neurological:  Positive for numbness and tingling.     Objective:     Physical Exam  Vitals and nursing note reviewed.   Constitutional:       General: He is not in acute distress.     Appearance: Normal appearance. He is not ill-appearing.   HENT:      Head: Normocephalic.   Eyes:      Conjunctiva/sclera: Conjunctivae normal.   Pulmonary:      Effort: No respiratory distress.   Musculoskeletal:      Comments: Left elbow medial discomfort with resisted flexion more than with extension. Discomfort with supination not pronation. Resisted extension causes pain to medial elbow. TTP lateral elbow proximal flexor tendons and  medial elbow over  extensor tendons and UCL region. Decreased sensation with moderate pressure to left 4th and 5th digits. Normal sensation in digits 1-3. Decreased strength with thumb opposition for digits 4-5.   Skin:     General: Skin is warm and dry.      Findings: No erythema.   Neurological:      Mental Status: He is alert and oriented to person, place, and time.      GCS: GCS eye subscore is 4. GCS verbal subscore is 5. GCS motor subscore is 6.   Psychiatric:         Attention and Perception: Attention normal.         Mood and Affect: Mood normal.         Behavior: Behavior normal.        Assessment:      1. Elbow sprain, left, initial encounter    2. Paresthesias in left hand    3. Elbow strain, left, initial encounter    4. Encounter related to worker's compensation claim      Plan:     Patient has has minor improvement in symptoms but continues to have significant paresthesias in the left hand. Symptoms consistent with ulnar and medial nerve involvement given the distribution of the numbness and tingling. Likely secondary to acute injury, inflammation, and nerve compression. He has been treated today with an IM steroid to further help provide relief from nerve inflammation and advised to begin naproxen bid. He says he has Aleve at home and we discussed proper dosing (1-2 tabs PO q12h with food). Also provided patient with home exercises to address the lateral elbow and forearm pain. Plan to re-eval in 1 week and we discussed initiation of PT if no improvement in symptoms by that time.     Medications Ordered This Encounter   Medications    dexAMETHasone injection 10 mg     Patient Instructions: Daily home exercises/warm soaks   Restrictions: No lifting/pushing/pulling more than 10 lbs, Limited use of left hand and arm  Follow up in about 10 days (around 2/10/2025).    I spent a total of 30 minutes on the day of the visit. This includes face to face time and non-face to face time preparing to see the patient (eg, review of  tests, prior records/notes), obtaining and/or reviewing separately obtained history, documenting clinical information in the electronic or other health record, independently interpreting results and communicating results to the patient.

## 2025-02-10 ENCOUNTER — OFFICE VISIT (OUTPATIENT)
Dept: URGENT CARE | Facility: CLINIC | Age: 45
End: 2025-02-10
Payer: OTHER MISCELLANEOUS

## 2025-02-10 DIAGNOSIS — S53.402A ELBOW SPRAIN, LEFT, INITIAL ENCOUNTER: Primary | ICD-10-CM

## 2025-02-10 DIAGNOSIS — R20.2 PARESTHESIAS IN LEFT HAND: ICD-10-CM

## 2025-02-10 PROCEDURE — 99214 OFFICE O/P EST MOD 30 MIN: CPT | Mod: S$GLB,,, | Performed by: EMERGENCY MEDICINE

## 2025-02-10 NOTE — LETTER
[facility] [address] [address]  Phone: [phone]  Fax: [phone]  [facility]: [id number]    Pt Name: Hadley Fulton  Injury Date: 01/23/2025   Employee ID:  Date of First Treatment: 02/10/2025   Company: [employer]      Appointment Time: 09:00 AM Arrived: 9:35 am   Provider: Rocky Talbot MD Time Out:10:45 am     Office Treatment:   1. Elbow sprain, left, initial encounter    2. Paresthesias in left hand          Patient Instructions: Attention not to aggravate affected area, Elevated affected area, Apply ice 24-48 hours then apply heat/warm soaks, PT to be scheduled once authorized, Begin Physical Therapy    Restrictions: No lifting/pushing/pulling more than 10 lbs, Limited use of left hand and arm     Return Appointment: 3/3/2025 at 9:00 am Darwin

## 2025-02-10 NOTE — PROGRESS NOTES
Subjective:      Patient ID: Hadley Fulton is a 44 y.o. male.    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  Do feel that this is local trauma to the ulnar nerve and potentially branch of the median nerve due to the elbow trauma however x-ray negative and not consistent with any dislocation and strength is intact.  This should improve slowly over time and will continue anti-inflammatory light duty restrictions and encouraged to rest ice and elevate when possible.  Physical therapy orders so that official therapy and progress can be documented.  He is taking naproxen over-the-counter will continue to do so.  Return to clinic in 2-3 weeks.    Elbow Injury  Associated symptoms include numbness. Pertinent negatives include no arthralgias, chest pain, chills, coughing, fever, neck pain or sore throat.     ros  Constitution: Negative for chills and fever.   HENT:  Negative for postnasal drip, sinus pain and sore throat.    Neck: Negative for neck pain and neck stiffness.   Cardiovascular:  Negative for chest pain and palpitations.   Respiratory:  Negative for cough and shortness of breath.    Genitourinary:  Negative for dysuria and hematuria.   Musculoskeletal:  Positive for pain and trauma. Negative for joint pain.   Skin:  Negative for wound, laceration and erythema.   Neurological:  Positive for numbness and tingling. Negative for altered mental status.   Psychiatric/Behavioral:  Negative  for altered mental status.       Objective:     Physical Exam  Vitals and nursing note reviewed.   Constitutional:       General: He is not in acute distress.     Appearance: Normal appearance. He is not ill-appearing.   HENT:      Head: Normocephalic.   Eyes:      Conjunctiva/sclera: Conjunctivae normal.   Pulmonary:      Effort: No respiratory distress.   Musculoskeletal:         General: Signs of injury present. No tenderness. Normal range of motion.      Comments: Left elbow medial discomfort with resisted flexion more than with extension. Discomfort with supination not pronation. Resisted extension causes pain to medial elbow. TTP lateral elbow proximal flexor tendons and  medial elbow over extensor tendons and UCL region. Decreased sensation with moderate pressure to left 4th and 5th digits. Normal sensation in digits 1-3. Decreased strength with thumb opposition for digits 4-5.   Skin:     General: Skin is warm and dry.      Findings: No erythema.   Neurological:      Mental Status: He is alert and oriented to person, place, and time.      GCS: GCS eye subscore is 4. GCS verbal subscore is 5. GCS motor subscore is 6.      Sensory: Sensory deficit present.      Comments: Ulnar nerve distribution of paresthesias and decreased sensation left 4th and entire 5th digit.  Also occasional paresthesias to the thumb.  Potentially branch of the median nerve.  Today has normal  strength.   Psychiatric:         Attention and Perception: Attention normal.         Mood and Affect: Mood normal.         Behavior: Behavior normal.        Assessment:      1. Elbow sprain, left, initial encounter    2. Paresthesias in left hand      Plan:     Reports significant improvement of the pain of the left elbow and forearm proximally however still having persistent decreased sensation to the left 4th and 5th digit as well as occasional tingling to the thumb.  Do feel that this is local trauma to the ulnar nerve and potentially  branch of the median nerve due to the elbow trauma however x-ray negative and not consistent with any dislocation and strength is intact.  This should improve slowly over time and will continue anti-inflammatory light duty restrictions and encouraged to rest ice and elevate when possible.  Physical therapy orders so that official therapy and progress can be documented.  He is taking naproxen over-the-counter will continue to do so.  Return to clinic in 2-3 weeks.     Patient Instructions: Attention not to aggravate affected area, Elevated affected area, Apply ice 24-48 hours then apply heat/warm soaks, PT to be scheduled once authorized, Begin Physical Therapy   Restrictions: No lifting/pushing/pulling more than 10 lbs, Limited use of left hand and arm  Follow up in about 3 weeks (around 3/3/2025).

## 2025-02-11 ENCOUNTER — PATIENT MESSAGE (OUTPATIENT)
Dept: UROLOGY | Facility: CLINIC | Age: 45
End: 2025-02-11
Payer: COMMERCIAL

## 2025-02-28 ENCOUNTER — CLINICAL SUPPORT (OUTPATIENT)
Dept: REHABILITATION | Facility: OTHER | Age: 45
End: 2025-02-28
Attending: EMERGENCY MEDICINE
Payer: OTHER MISCELLANEOUS

## 2025-02-28 DIAGNOSIS — M25.522 LEFT ELBOW PAIN: Primary | ICD-10-CM

## 2025-02-28 DIAGNOSIS — R29.898 DECREASED GRIP STRENGTH OF LEFT HAND: ICD-10-CM

## 2025-02-28 PROCEDURE — 97110 THERAPEUTIC EXERCISES: CPT | Mod: PN

## 2025-02-28 PROCEDURE — 97166 OT EVAL MOD COMPLEX 45 MIN: CPT | Mod: PN

## 2025-02-28 NOTE — PATIENT INSTRUCTIONS
[facility]  HOME EXERCISE PROGRAM        ULNAR NERVE: Flossing I  - Stand with right arm at shoulder height, hand and fingers bent back. Simultaneously bend elbow and wrist.  - Perform 20 reps, 3x/day      Perform the following stretch as needed:     Composite wrist extension stretch:  -Hold arm straight out in front of you, elbow extended, and palm up  -Use other hand to bend the wrist back  -Make sure to bend at the wrist, not at the finger tips! Avoid hyperextension of the fingers  -Hold for 30 seconds. Repeat 3x.      Perform the following exercises, 20 reps each, multiple times a day (5-7x)        TouchDowns  - Begin by standing in front of a wall with your arms bent at 90 degrees, pinky side of hand and forearm against the wall.  - Slide both arms up the wall at a steady pace until tension is felt. Then from there at the top, shrug your shoulders (as if reaching further with entire shoulder/shoulder blade as a unit), hold a few seconds, then relax and return to starting position.  - Perform 20 reps        Posture Drill - [place]  - Begin with elbows by side, bent to 90*  - Then swivel/rotate hands out while keeping your elbow by to your side  - Then from there, keeping elbows bent to 90*, move your arms overhead as if creating a snow kaitlin  - Return to starting position and repeat  - Perform 20 reps      Therapist: ITZEL Marinelli/JACI, CHT
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
## 2025-03-05 ENCOUNTER — TELEPHONE (OUTPATIENT)
Dept: URGENT CARE | Facility: CLINIC | Age: 45
End: 2025-03-05
Payer: COMMERCIAL

## 2025-03-05 NOTE — TELEPHONE ENCOUNTER
Called patient and left a voice message and call back number regarding the missed [facility] Health Appointment. [employer]

## 2025-03-07 ENCOUNTER — CLINICAL SUPPORT (OUTPATIENT)
Dept: REHABILITATION | Facility: OTHER | Age: 45
End: 2025-03-07
Payer: OTHER MISCELLANEOUS

## 2025-03-07 DIAGNOSIS — M25.522 LEFT ELBOW PAIN: Primary | ICD-10-CM

## 2025-03-07 DIAGNOSIS — R29.898 DECREASED GRIP STRENGTH OF LEFT HAND: ICD-10-CM

## 2025-03-07 PROCEDURE — 97112 NEUROMUSCULAR REEDUCATION: CPT | Mod: PN

## 2025-03-07 PROCEDURE — 97110 THERAPEUTIC EXERCISES: CPT | Mod: PN
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
## 2025-03-14 ENCOUNTER — CLINICAL SUPPORT (OUTPATIENT)
Dept: REHABILITATION | Facility: OTHER | Age: 45
End: 2025-03-14
Payer: COMMERCIAL

## 2025-03-14 DIAGNOSIS — R29.898 DECREASED GRIP STRENGTH OF LEFT HAND: ICD-10-CM

## 2025-03-14 DIAGNOSIS — M25.522 LEFT ELBOW PAIN: Primary | ICD-10-CM

## 2025-03-14 PROCEDURE — 97112 NEUROMUSCULAR REEDUCATION: CPT | Mod: PN

## 2025-03-14 PROCEDURE — 97140 MANUAL THERAPY 1/> REGIONS: CPT | Mod: PN

## 2025-03-14 PROCEDURE — 97110 THERAPEUTIC EXERCISES: CPT | Mod: PN

## 2025-03-18 ENCOUNTER — CLINICAL SUPPORT (OUTPATIENT)
Dept: REHABILITATION | Facility: OTHER | Age: 45
End: 2025-03-18
Payer: OTHER MISCELLANEOUS

## 2025-03-18 DIAGNOSIS — M25.522 LEFT ELBOW PAIN: Primary | ICD-10-CM

## 2025-03-18 PROCEDURE — 97110 THERAPEUTIC EXERCISES: CPT | Mod: PN | Performed by: OCCUPATIONAL THERAPIST

## 2025-03-18 PROCEDURE — 97112 NEUROMUSCULAR REEDUCATION: CPT | Mod: PN | Performed by: OCCUPATIONAL THERAPIST

## 2025-03-18 PROCEDURE — 97140 MANUAL THERAPY 1/> REGIONS: CPT | Mod: PN | Performed by: OCCUPATIONAL THERAPIST
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
## 2025-03-25 ENCOUNTER — CLINICAL SUPPORT (OUTPATIENT)
Dept: REHABILITATION | Facility: OTHER | Age: 45
End: 2025-03-25
Payer: OTHER MISCELLANEOUS

## 2025-03-25 DIAGNOSIS — M25.522 LEFT ELBOW PAIN: Primary | ICD-10-CM

## 2025-03-25 PROCEDURE — 97140 MANUAL THERAPY 1/> REGIONS: CPT | Mod: PN | Performed by: OCCUPATIONAL THERAPIST

## 2025-03-25 PROCEDURE — 97110 THERAPEUTIC EXERCISES: CPT | Mod: PN | Performed by: OCCUPATIONAL THERAPIST

## 2025-03-25 PROCEDURE — 97112 NEUROMUSCULAR REEDUCATION: CPT | Mod: PN | Performed by: OCCUPATIONAL THERAPIST
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
## 2025-03-26 ENCOUNTER — OFFICE VISIT (OUTPATIENT)
Dept: URGENT CARE | Facility: CLINIC | Age: 45
End: 2025-03-26
Payer: OTHER MISCELLANEOUS

## 2025-03-26 VITALS
OXYGEN SATURATION: 97 % | DIASTOLIC BLOOD PRESSURE: 89 MMHG | HEART RATE: 105 BPM | TEMPERATURE: 98 F | RESPIRATION RATE: 18 BRPM | HEIGHT: 73 IN | WEIGHT: 251 LBS | SYSTOLIC BLOOD PRESSURE: 134 MMHG | BODY MASS INDEX: 33.27 KG/M2

## 2025-03-26 DIAGNOSIS — S53.402A ELBOW SPRAIN, LEFT, INITIAL ENCOUNTER: Primary | ICD-10-CM

## 2025-03-26 DIAGNOSIS — R20.2 PARESTHESIAS IN LEFT HAND: ICD-10-CM
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
## 2025-03-26 DIAGNOSIS — M25.522 LEFT ELBOW PAIN: ICD-10-CM

## 2025-03-26 NOTE — LETTER
[facility] [address] [address]  Phone: [phone]  Fax: [phone]  [facility]: [phone]    Pt Name: Hadley Fulton  Injury Date:     Employee ID:  Date of First Treatment: 03/26/2025   Company: [employer]      Appointment Time: 08:00 AM Arrived: 8:30 am   Provider: Dean Castañeda PA-C Time Out:9:30 am     Office Treatment:   1. Elbow sprain, left, initial encounter    2. Left elbow pain    3. Paresthesias in left hand    4. Work related injury          Patient Instructions: Daily home exercises/warm soaks, MRI to be scheduled once authorized, Referral to specialist to be scheduled, once authorized    Restrictions: Regular Duty, Discharged to Ortho/Neuro/Opthomologist/Surgeon (Attention not to aggravate left elbow.)     Return Appointment: DEREJE

## 2025-03-26 NOTE — PROGRESS NOTES
Subjective:      Patient ID: Hadley Fulton is a 44 y.o. male.    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  He reports no significant improvement since last office visit.  Says he has dull pain in the medial aspect of the left elbow with intermittent sharp pains.  Also reports numbness and tingling in the left ring and little fingers intermittently.  Says his left hand appears to be weaker than the right.  Patient has been doing occupational therapy and home exercises.  Patient also reports taking OTC aleve.  Patient is currently working light duty. [employer]    Elbow Injury  Associated symptoms include arthralgias and numbness. Pertinent negatives include no neck pain.       Constitution: Positive for activity change.   HENT:  Negative for facial trauma.    Neck: Negative for neck pain.   Cardiovascular:  Negative for chest trauma.   Eyes:  Negative for eye trauma.   Respiratory:  Negative for shortness of breath.    Gastrointestinal:  Negative for abdominal trauma.   Musculoskeletal:  Positive for joint pain. Negative for abnormal ROM of joint and pain with walking.   Skin:  Negative for wound and bruising.   Neurological:  Positive for numbness and tingling.     Objective:     Physical Exam  Vitals and nursing note reviewed.   Constitutional:       General: He is not in acute distress.     Appearance: He is well-developed. He is not diaphoretic.   HENT:      Head: Normocephalic and atraumatic.      Right Ear: Hearing and external ear normal.      Left Ear: Hearing and external ear normal.       Nose: Nose normal. No nasal deformity.   Eyes:      General: Lids are normal. No scleral icterus.     Conjunctiva/sclera: Conjunctivae normal.   Neck:      Trachea: Trachea normal.   Cardiovascular:      Pulses: Normal pulses.   Pulmonary:      Effort: Pulmonary effort is normal. No respiratory distress.      Breath sounds: No stridor.   Musculoskeletal:      Left elbow: No swelling. Normal range of motion. Tenderness present in medial epicondyle.      Left wrist: No swelling, deformity or tenderness. Normal range of motion.      Left hand: No swelling, deformity or tenderness. Normal range of motion. Normal sensation. Normal pulse.      Cervical back: Normal range of motion.   Skin:     General: Skin is warm and dry.      Capillary Refill: Capillary refill takes less than 2 seconds.   Neurological:      Mental Status: He is alert. He is not disoriented.      GCS: GCS eye subscore is 4. GCS verbal subscore is 5. GCS motor subscore is 6.      Sensory: No sensory deficit.   Psychiatric:         Attention and Perception: He is attentive.         Speech: Speech normal.         Behavior: Behavior normal.        Assessment:      1. Elbow sprain, left, initial encounter    2. Left elbow pain    3. Paresthesias in left hand    4. Work related injury      Plan:     Patient with persistent left elbow pain.  Not improving with occupational therapy and anti-inflammatories.  I will order MRI, EMG/NCS and refer to orthopedics for further evaluation and management.  Advised to continue warm soaks, home exercises and OTC NSAIDs needed.  Patient verbalized understanding and agreement.     Patient Instructions: Daily home exercises/warm soaks, MRI to be scheduled once authorized, Referral to specialist to be scheduled, once authorized   Restrictions: Regular Duty, Discharged to Ortho/Neuro/Opthomologist/Surgeon (Attention not to aggravate left elbow.)  No follow-ups on file.

## 2025-04-01 ENCOUNTER — HOSPITAL ENCOUNTER (OUTPATIENT)
Dept: RADIOLOGY | Facility: OTHER | Age: 45
Discharge: HOME | End: 2025-04-01
Attending: PHYSICIAN ASSISTANT
Payer: OTHER MISCELLANEOUS

## 2025-04-01 DIAGNOSIS — M25.522 LEFT ELBOW PAIN: ICD-10-CM
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
## 2025-04-01 PROCEDURE — 73221 MRI JOINT UPR EXTREM W/O DYE: CPT | Mod: TC,LT

## 2025-04-01 PROCEDURE — 73221 MRI JOINT UPR EXTREM W/O DYE: CPT | Mod: 26,LT,, | Performed by: RADIOLOGY

## 2025-04-02 ENCOUNTER — RESULTS FOLLOW-UP (OUTPATIENT)
Dept: URGENT CARE | Facility: CLINIC | Age: 45
End: 2025-04-02

## 2025-04-08 ENCOUNTER — PATIENT MESSAGE (OUTPATIENT)
Dept: UROLOGY | Facility: CLINIC | Age: 45
End: 2025-04-08
Payer: COMMERCIAL

## 2025-04-09 DIAGNOSIS — E29.1 HYPOGONADISM IN MALE: ICD-10-CM

## 2025-04-09 DIAGNOSIS — N52.1 ERECTILE DYSFUNCTION DUE TO DISEASES CLASSIFIED ELSEWHERE: ICD-10-CM

## 2025-04-09 RX ORDER — TADALAFIL 20 MG/1
20 TABLET ORAL
Qty: 20 TABLET | Refills: 11 | Status: SHIPPED | OUTPATIENT
Start: 2025-04-09

## 2025-04-15 ENCOUNTER — TELEPHONE (OUTPATIENT)
Dept: ORTHOPEDICS | Facility: CLINIC | Age: 45
End: 2025-04-15
Payer: COMMERCIAL

## 2025-04-15 NOTE — TELEPHONE ENCOUNTER
Work comp date of injury 01/23/2025 - patient grabbed rail on truck at work with his left hand to avoid slipping and falling on ice    Has paresthesia to his ring and small finger - EMG ordered and scheduled  Patient is scheduled to Dr. 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    Has failed 4 weeks of physical therapy    Derrek Palacios MS, OTC   Sports Medicine Assistant   [facility]  (P) [phone]  (F) [phone]

## 2025-05-01 ENCOUNTER — TELEPHONE (OUTPATIENT)
Dept: PHYSICAL MEDICINE AND REHAB | Facility: CLINIC | Age: 45
End: 2025-05-01
Payer: COMMERCIAL

## 2025-05-01 NOTE — TELEPHONE ENCOUNTER
Spoke with patient in regards to his EMG appointment with Dr. Wharton was cancel due to Dr. Wharton does not see Workers Comp patients.Patient express understanding.

## 2025-05-15 ENCOUNTER — PATIENT MESSAGE (OUTPATIENT)
Dept: ADMINISTRATIVE | Facility: OTHER | Age: 45
End: 2025-05-15
Payer: COMMERCIAL

## 2025-05-28 ENCOUNTER — TELEPHONE (OUTPATIENT)
Dept: ORTHOPEDICS | Facility: CLINIC | Age: 45
End: 2025-05-28
Payer: COMMERCIAL

## 2025-05-28 DIAGNOSIS — G89.29 CHRONIC PAIN OF LEFT ELBOW: Primary | ICD-10-CM

## 2025-05-28 DIAGNOSIS — M25.522 CHRONIC PAIN OF LEFT ELBOW: Primary | ICD-10-CM

## 2025-05-28 NOTE — TELEPHONE ENCOUNTER
Work Comp: DOI 1/23/25    Rescheduled patient's appointment with Dr. Jordan as would need EMG prior to Appointment    Derrek Palacios MS, OTC   Sports Medicine Assistant   [facility]  (P) [phone]  (F) [phone]

## 2025-06-13 DIAGNOSIS — G89.29 CHRONIC PAIN OF LEFT ELBOW: Primary | ICD-10-CM

## 2025-06-13 DIAGNOSIS — M25.522 CHRONIC PAIN OF LEFT ELBOW: Primary | ICD-10-CM

## 2025-06-24 ENCOUNTER — PROCEDURE VISIT (OUTPATIENT)
Dept: NEUROLOGY | Facility: CLINIC | Age: 45
End: 2025-06-24
Payer: OTHER MISCELLANEOUS
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
## 2025-06-24 DIAGNOSIS — R20.2 PARESTHESIAS IN LEFT HAND: ICD-10-CM

## 2025-06-24 PROCEDURE — 95910 NRV CNDJ TEST 7-8 STUDIES: CPT | Mod: S$GLB,,, | Performed by: PSYCHIATRY & NEUROLOGY

## 2025-06-24 PROCEDURE — 99203 OFFICE O/P NEW LOW 30 MIN: CPT | Mod: S$GLB,,, | Performed by: PSYCHIATRY & NEUROLOGY

## 2025-06-24 PROCEDURE — 95886 MUSC TEST DONE W/N TEST COMP: CPT | Mod: S$GLB,,, | Performed by: PSYCHIATRY & NEUROLOGY

## 2025-06-24 NOTE — PROCEDURES
EMG W/ ULTRASOUND AND NERVE CONDUCTION TEST 1 Extremity    Date/Time: 6/24/2025 11:00 AM    Performed by: Dean Perez MD  Authorized by: Dean Castañeda PA-C                                                                 [facility] [address] Neurology    Subjective:       Patient ID: Hadley Fulton is a 44 y.o. male here for a EMG focused evaluation for arm pain. Previous visits and diagnostic evaluation reviewed.  Patient describes a pulling type of injury of the left elbow which occurred while at work January 23, 2025.  He began experiencing left elbow pain as well as left hand weakness and numbness of the left 4th and 5th digits.  He states the weakness has significantly improved but the numbness persists.  He has a remote history of neck surgery but denies any neck pain.    HPI  Review of patient's allergies indicates:   Allergen Reactions    Mushroom Hives     Other reaction(s): Watery eye      There were no vitals filed for this visit.   Chief Complaint: No chief complaint on file.    Past Medical History:   Diagnosis Date    Hearing loss       Social History     Socioeconomic History    Marital status:    Tobacco Use    Smoking status: Never     Passive exposure: Never    Smokeless tobacco: Never   Substance and Sexual Activity    Alcohol use: No    Drug use: No    Sexual activity: Yes     Partners: Female            Objective:      Physical Exam    Constitutional: Patient appears well-nourished.   Head: Normocephalic and atraumatic.   Mouth/Throat: Oropharynx is clear and moist.   Pulmonary/Chest: Effort normal.   Abdominal: Soft.   Skin: Skin is warm and dry.      General:  Patient is alert and cooperative.  Affect:  Patient is appropriate to surroundings and environment.  Language:  Speech is fluent.  HEENT:  There are no outward signs of trauma to head or face.  Cranial Nerves:  Pupils are equal round and reactive to light. Extra-ocular movements are intact. Face,  tongue, and palate are symmetrical.  Motor:  Patient exhibits normal strength testing in bilateral proximal and distal upper extremities.  Reflexes:  Symmetrical in bilateral upper extremities.  Gait:  Ambulation is independent without use of cane or walker without signs of ataxia or circumduction.  Cerebellar:  No evidence of dysmetria.  Sensory:  Intact to sensory modalities tested.  Musculoskeletal:  There is no severe tenderness to palpation and manipulation of the cervical spine region.     TSH   Date Value Ref Range Status   12/04/2023 1.255 0.400 - 4.000 uIU/mL Final   02/27/2019 1.67 0.45 - 5.33 uIU/mL Final     Hemoglobin A1C   Date Value Ref Range Status   12/04/2023 5.1 4.0 - 5.6 % Final     Comment:     ADA Screening Guidelines:  5.7-6.4%  Consistent with prediabetes  >or=6.5%  Consistent with diabetes    High levels of fetal hemoglobin interfere with the HbA1C  assay. Heterozygous hemoglobin variants (HbS, HgC, etc)do  not significantly interfere with this assay.   However, presence of multiple variants may affect accuracy.          No C spine MRI results found for this or any previous visit.      Assessment:       We reviewed and discussed at length results of EMG of the left upper extremity performed today revealing mild left carpal tunnel syndrome without significant evidence of cervical radiculopathies.  Despite numbness of the 4th and 5th digits this study fails to show significant evidence of ulnar neuropathy. These findings are available via media section of chart review.   1. Paresthesias in left hand    2. Work related injury              Plan:       We discussed treatment options at length. Recommend patient keep appointment with referring provider.         I spent a total of 35 minutes on the day of the visit. This includes face to face time and non-face to face time preparing to see the patient (eg, review of tests), obtaining and/or reviewing separately obtained history, documenting  clinical information in the electronic or other health record, independently interpreting results and communicating results to the patient/family/caregiver, or care coordinator  .  Dean Perez MD, FAAN   06/24/2025   11:47 AM     A dictation device was used to produce this document. Use of such devices sometimes results in grammatical errors or replacement of words that sound similarly.

## 2025-08-05 ENCOUNTER — PATIENT MESSAGE (OUTPATIENT)
Dept: UROLOGY | Facility: CLINIC | Age: 45
End: 2025-08-05
Payer: COMMERCIAL